# Patient Record
Sex: FEMALE | Race: WHITE | NOT HISPANIC OR LATINO | Employment: OTHER | URBAN - METROPOLITAN AREA
[De-identification: names, ages, dates, MRNs, and addresses within clinical notes are randomized per-mention and may not be internally consistent; named-entity substitution may affect disease eponyms.]

---

## 2022-05-17 ENCOUNTER — TRANSFERRED RECORDS (OUTPATIENT)
Dept: INTERNAL MEDICINE | Facility: CLINIC | Age: 72
End: 2022-05-17

## 2022-07-27 ENCOUNTER — OFFICE VISIT (OUTPATIENT)
Dept: INTERNAL MEDICINE | Facility: CLINIC | Age: 72
End: 2022-07-27
Payer: COMMERCIAL

## 2022-07-27 VITALS
OXYGEN SATURATION: 96 % | DIASTOLIC BLOOD PRESSURE: 82 MMHG | BODY MASS INDEX: 29.02 KG/M2 | SYSTOLIC BLOOD PRESSURE: 124 MMHG | HEART RATE: 82 BPM | HEIGHT: 62 IN | WEIGHT: 157.7 LBS | TEMPERATURE: 98.2 F | RESPIRATION RATE: 16 BRPM

## 2022-07-27 DIAGNOSIS — F43.10 PTSD (POST-TRAUMATIC STRESS DISORDER): ICD-10-CM

## 2022-07-27 DIAGNOSIS — K21.9 GASTROESOPHAGEAL REFLUX DISEASE WITHOUT ESOPHAGITIS: ICD-10-CM

## 2022-07-27 DIAGNOSIS — Z87.898 HISTORY OF MOTION SICKNESS: ICD-10-CM

## 2022-07-27 DIAGNOSIS — M79.604 PAIN IN BOTH LOWER EXTREMITIES: ICD-10-CM

## 2022-07-27 DIAGNOSIS — E11.9 TYPE 2 DIABETES MELLITUS WITHOUT COMPLICATION, WITHOUT LONG-TERM CURRENT USE OF INSULIN (H): Primary | ICD-10-CM

## 2022-07-27 DIAGNOSIS — R20.2 PARESTHESIA: ICD-10-CM

## 2022-07-27 DIAGNOSIS — L43.9 LICHEN PLANUS: ICD-10-CM

## 2022-07-27 DIAGNOSIS — A60.04 HERPES SIMPLEX VULVOVAGINITIS: ICD-10-CM

## 2022-07-27 DIAGNOSIS — M79.605 PAIN IN BOTH LOWER EXTREMITIES: ICD-10-CM

## 2022-07-27 DIAGNOSIS — Z78.0 POST-MENOPAUSAL: ICD-10-CM

## 2022-07-27 DIAGNOSIS — Z12.11 SPECIAL SCREENING FOR MALIGNANT NEOPLASMS, COLON: ICD-10-CM

## 2022-07-27 PROCEDURE — 99204 OFFICE O/P NEW MOD 45 MIN: CPT | Performed by: INTERNAL MEDICINE

## 2022-07-27 RX ORDER — BLOOD SUGAR DIAGNOSTIC
STRIP MISCELLANEOUS
COMMUNITY

## 2022-07-27 RX ORDER — FLUOXETINE 10 MG/1
10 CAPSULE ORAL DAILY
COMMUNITY
Start: 2020-10-12

## 2022-07-27 RX ORDER — ACETAMINOPHEN 325 MG/1
325 TABLET ORAL EVERY 6 HOURS PRN
COMMUNITY
Start: 2020-03-27

## 2022-07-27 RX ORDER — SCOLOPAMINE TRANSDERMAL SYSTEM 1 MG/1
1 PATCH, EXTENDED RELEASE TRANSDERMAL
Qty: 4 PATCH | Refills: 0 | Status: SHIPPED | OUTPATIENT
Start: 2022-07-27

## 2022-07-27 RX ORDER — HYDROCORTISONE 2.5 %
CREAM (GRAM) TOPICAL 2 TIMES DAILY
COMMUNITY

## 2022-07-27 RX ORDER — ATORVASTATIN CALCIUM 10 MG/1
10 TABLET, FILM COATED ORAL DAILY
COMMUNITY
End: 2022-09-26

## 2022-07-27 NOTE — PATIENT INSTRUCTIONS
I'd recommend checking with your insurance to see if they cover the new shingles vaccine, Shingrix.  This vaccine is much more effective than the older shingles vaccine.  Some insurances only cover this if you get it at the pharmacy rather than the clinic, so either check on this or just plan to get the vaccine at a pharmacy.       *    Thank you for visiting the Primary Care Center today at the [facility]! The following is some information about our clinic:     Primary Care Center Frequently-Asked Questions    (1) How do I schedule appointments at the [facility]?     Primary Care--to schedule or make changes to an existing appointment, please call our primary care line at [phone].    Labs--to schedule a lab appointment at the [facility] you can use [employer] or call [phone]. If you have a [facility] location that is closer to home, you can reach out to that location for scheduling options.     Imaging--if you need to schedule a CT, X-ray, MRI, ultrasound, or other imaging study you can call [phone] to schedule at the [facility] or any other [facility] imaging location.     Referrals--if a referral to another specialty was ordered you can expect a phone call from their scheduling team. If you have not heard from them in a week, please call us or send us a [employer] message to check the status or get a scheduling number. Please note that this only applies to internal [facility] referrals. If the referral is external you would need to contact their office for scheduling.     (2) I have a question about my visit, who do I contact?     You can call us at the primary care line at [phone] to ask questions about your visit. You can also send a secure message through [employer], which is reviewed by clinic staff. Please note that [employer] messages have a twenty-four to forty-eight business hour turnaround time and should not be used for  urgent concerns.    (3) How will I get the results of my tests?    If you are signed up for [employer] all tests will be released to you within twenty-four hours of resulting. Please allow three to five days for your doctor to review your results and place a note interpreting the results. If you do not have MyChart you will receive your results through mail seven to ten business days following the return of the tests. Please note that if there should be any urgent or concerning results that your doctor or their registered nurse will reach out to you the same day as the tests come back. If you have follow up questions about your results or would like to discuss the results in detail please schedule a follow up with your provider either in person or virtually.     (4) How do I get refills of my prescriptions?     You should always first contact your pharmacy for refills of your medications. If submitting a refill request on [employer], please be sure to submit the request only once--repeat requests can cause delays in refill. If you are requesting a NEW medication or a medication related to new symptoms you will need to schedule an appointment with a provider prior to approval. Please note: Routine medication refills have up to one to three business day turnaround whereas controlled substances refills have up to five to seven business day turnaround.    (5) I have new symptoms, what do I do?     If you are having an immediate medical emergency, you should dial 911 for assistance.   For anything urgent that needs to be seen within a few hours to one day you should visit a local urgent care for assistance.  For non-urgent symptoms that need to be seen within a few days to a week you can schedule with an available provider in primary care by going to [employer] or calling [phone].   If you are not sure how serious your symptoms are or you would like to receive medical advice you can always call [phone] to speak with a  triage nurse.

## 2022-07-27 NOTE — NURSING NOTE
Chief Complaint   Patient presents with     Establish Care     Musculoskeletal Problem       Ramon Douglas, EMT at 12:23 PM on 7/27/2022.

## 2022-07-27 NOTE — PROGRESS NOTES
Assessment & Plan     Type 2 diabetes mellitus without complication, without long-term current use of insulin (H)    Adequately controlled with A1C of 7.8 in March.  Continue metformin and atorvastatin.  Check labs in September.  Normal foot exam today.  She reports eye exam is UTD; will request records.     - Hemoglobin A1c; Future  - Lipid panel reflex to direct LDL Fasting; Future    History of motion sickness    Going whale watching and she requests refill of scopolamine patch which she has used in the past.     - scopolamine (TRANSDERM) 1 MG/3DAYS 72 hr patch; Place 1 patch onto the skin every 72 hours    Special screening for malignant neoplasms, colon    - Fecal cancer screen FIT; Future    Post-menopausal    - Dexa hip/pelvis/spine*; Future    Pain in both lower extremities  And  Paresthesia     Appears to have some bony deformity in the R lateral foot and ankle, which may be arthritis related and possible contributory to pain.  Distribution of symptoms could be suggestive of neuropathy, though her description of symptoms is not classic and monofilament exam is normal.  Will check labs as below at next lab draw.  Discussed x-ray of R foot and ankle, but she deferred further work-up for now as symptoms are manageable.  Could also consider EMG for further eval.     - Hemoglobin A1c; Future  - Anti Nuclear Francoise IgG by IFA with Reflex; Future  - Erythrocyte sedimentation rate; Future  - Protein electrophoresis; Future  - TSH with free T4 reflex; Future    PTSD (post-traumatic stress disorder)    Well controlled on fluoxetine    Herpes simplex vulvovaginitis    Takes acyclovir prn    Gastroesophageal reflux disease without esophagitis    Well controlled with dietary management    Lichen planus    Is monitoring this through dentist        Return in about 2 months (around 9/27/2022) for Follow up with labs prior.    Kofi Hand MD  [facility] INTERNAL MEDICINE HOLGER Meneses is  "a 71 year old, presenting for the following health issues:  Establish Care and Musculoskeletal Problem      HPI     Hayden moved to the area recently from [place].  She has a history of left knee replacement and R knee athroscopy and has been having bilateral lower leg pain for a couple of years, starting first in a toe on the R foot then spreading through the feet and spreading up to the knees.  Feels like a deeper MSK sensation, like something is changing.  No shooting pain.  \"Wringing like sensation\", not numb or burning.  Has rare episodes when the R foot freeze up and is painful.  Lots of L knee sensitivity.  Rarely gets leg/knee weakness with mis-stepping.  She isn't sure if this is diabetic neuropathy related or something else.  Has done PT in the past and they feel she is having some L hip problems as well.  Couple of years ago she noticed increased protrusion of the R lateral malleolus- hit is on a table.  Has noticed some protrusion in the R lateral foot.  Has not had this leg issues evaluated in the past.      She has a history of PTSD and reports doing well on Prozac.    Has a left hand contracture that has been stable.    History of genital herpes with prn acyclovir.    History of DM2.  Last A1C on 3/17 was 7.8.      History of reflux improved on reducing coffee.    Has had lichen planus for a few years being monitored by dentistry- no biopsy to date    She is working on weight loss.  Swims and walks frequently.      She is going whale watching in a few weeks and would like a refill on scopolamine patches.        Review of Systems   Constitutional, MSK, neuro, endo systems are negative, except as otherwise noted.      Objective    /82 (BP Location: Left arm, Patient Position: Sitting, Cuff Size: Adult Regular)   Pulse 82   Temp 98.2  F (36.8  C) (Oral)   Resp 16   Ht 1.575 m (5' 2\")   Wt 71.5 kg (157 lb 11.2 oz)   SpO2 96%   BMI 28.84 kg/m    Body mass index is 28.84 kg/m .  Physical Exam "   GENERAL: healthy, alert and no distress  MS: likely bony deformity of R lateral malleolus of ankle and R lateral foot  Diabetic foot exam: normal DP and PT pulses, no trophic changes or ulcerative lesions and normal monofilament exam              .  ..

## 2022-08-02 ENCOUNTER — TELEPHONE (OUTPATIENT)
Dept: INTERNAL MEDICINE | Facility: CLINIC | Age: 72
End: 2022-08-02

## 2022-08-02 NOTE — TELEPHONE ENCOUNTER
[facility] Call Center    Phone Message    May a detailed message be left on voicemail: yes     Reason for Call: Other: Per call from patient, please extend lab orders to 9/26 scheduled appt. Please reach out to patient for updates.      Action Taken: Message routed to:  [facility]    Travel Screening: Not Applicable

## 2022-08-26 ENCOUNTER — TELEPHONE (OUTPATIENT)
Dept: INTERNAL MEDICINE | Facility: CLINIC | Age: 72
End: 2022-08-26

## 2022-08-26 DIAGNOSIS — U07.1 INFECTION DUE TO 2019 NOVEL CORONAVIRUS: Primary | ICD-10-CM

## 2022-08-26 NOTE — TELEPHONE ENCOUNTER
RN called patient and let her know Rx was sent to pharmacy in [place], [place], and also relayed Dr. Hand's message about not taking this with Lipitor.    Marisela Rodríguez RN on 8/26/2022 at 2:22 PM

## 2022-08-26 NOTE — TELEPHONE ENCOUNTER
[facility] Call Center    Phone Message    May a detailed message be left on voicemail: yes     Reason for Call: Medication Question or concern regarding medication   Prescription Clarification  Name of Medication: paxlovid  Prescribing Provider: anyone   Pharmacy: [facility], [address] [place] [id number], phone is [phone]   What on the order needs clarification? Pt called and stated she tested positive for covid today, requesting above script be sent to the above pharmacy today. Pt stated she is currently in [place], they are 4 hours behind and the pharmacy does not open until 1pm our time

## 2022-08-26 NOTE — TELEPHONE ENCOUNTER
Patient called, following up on status of script. Per patient, it's urgent she gets script today as today is the last day window for treatment and if another provider can sign rx if Dr Hand is not available. Please review and reach out to patient.

## 2022-08-26 NOTE — TELEPHONE ENCOUNTER
Prescription for Paxlovid sent.  Please advise her to not take the atorvastatin while on Paxlovid since these interact.    Thanks,  Kofi Hand MD

## 2022-09-26 ENCOUNTER — LAB (OUTPATIENT)
Dept: LAB | Facility: CLINIC | Age: 72
End: 2022-09-26
Payer: COMMERCIAL

## 2022-09-26 DIAGNOSIS — E11.9 TYPE 2 DIABETES MELLITUS WITHOUT COMPLICATION, WITHOUT LONG-TERM CURRENT USE OF INSULIN (H): ICD-10-CM

## 2022-09-26 DIAGNOSIS — M79.604 PAIN IN BOTH LOWER EXTREMITIES: ICD-10-CM

## 2022-09-26 DIAGNOSIS — Z12.11 SPECIAL SCREENING FOR MALIGNANT NEOPLASMS, COLON: ICD-10-CM

## 2022-09-26 DIAGNOSIS — R20.2 PARESTHESIA: ICD-10-CM

## 2022-09-26 DIAGNOSIS — E11.9 TYPE 2 DIABETES MELLITUS WITHOUT COMPLICATION, WITHOUT LONG-TERM CURRENT USE OF INSULIN (H): Primary | ICD-10-CM

## 2022-09-26 DIAGNOSIS — M79.605 PAIN IN BOTH LOWER EXTREMITIES: ICD-10-CM

## 2022-09-26 LAB
CHOLEST SERPL-MCNC: 224 MG/DL
ERYTHROCYTE [SEDIMENTATION RATE] IN BLOOD BY WESTERGREN METHOD: 17 MM/HR (ref 0–30)
HBA1C MFR BLD: 8 %
HDLC SERPL-MCNC: 59 MG/DL
LDLC SERPL CALC-MCNC: 140 MG/DL
NONHDLC SERPL-MCNC: 165 MG/DL
TOTAL PROTEIN SERUM FOR ELP: 6.7 G/DL (ref 6.4–8.3)
TRIGL SERPL-MCNC: 123 MG/DL
TSH SERPL DL<=0.005 MIU/L-ACNC: 4.07 UIU/ML (ref 0.3–4.2)

## 2022-09-26 PROCEDURE — 80061 LIPID PANEL: CPT | Performed by: INTERNAL MEDICINE

## 2022-09-26 PROCEDURE — 83036 HEMOGLOBIN GLYCOSYLATED A1C: CPT | Performed by: INTERNAL MEDICINE

## 2022-09-26 PROCEDURE — 84165 PROTEIN E-PHORESIS SERUM: CPT | Mod: 26 | Performed by: PATHOLOGY

## 2022-09-26 PROCEDURE — 84155 ASSAY OF PROTEIN SERUM: CPT | Performed by: INTERNAL MEDICINE

## 2022-09-26 PROCEDURE — 86038 ANTINUCLEAR ANTIBODIES: CPT | Performed by: INTERNAL MEDICINE

## 2022-09-26 PROCEDURE — 85652 RBC SED RATE AUTOMATED: CPT | Performed by: PATHOLOGY

## 2022-09-26 PROCEDURE — 84165 PROTEIN E-PHORESIS SERUM: CPT | Mod: TC | Performed by: PATHOLOGY

## 2022-09-26 PROCEDURE — 36415 COLL VENOUS BLD VENIPUNCTURE: CPT | Performed by: PATHOLOGY

## 2022-09-26 PROCEDURE — 84443 ASSAY THYROID STIM HORMONE: CPT | Performed by: INTERNAL MEDICINE

## 2022-09-26 RX ORDER — ATORVASTATIN CALCIUM 20 MG/1
20 TABLET, FILM COATED ORAL DAILY
Qty: 90 TABLET | Refills: 3 | Status: SHIPPED | OUTPATIENT
Start: 2022-09-26 | End: 2023-08-01

## 2022-09-27 LAB
ALBUMIN SERPL ELPH-MCNC: 4.2 G/DL (ref 3.7–5.1)
ALPHA1 GLOB SERPL ELPH-MCNC: 0.3 G/DL (ref 0.2–0.4)
ALPHA2 GLOB SERPL ELPH-MCNC: 0.8 G/DL (ref 0.5–0.9)
ANA SER QL IF: NEGATIVE
B-GLOBULIN SERPL ELPH-MCNC: 0.8 G/DL (ref 0.6–1)
GAMMA GLOB SERPL ELPH-MCNC: 0.8 G/DL (ref 0.7–1.6)
M PROTEIN SERPL ELPH-MCNC: 0 G/DL
PROT PATTERN SERPL ELPH-IMP: NORMAL

## 2022-09-28 ENCOUNTER — OFFICE VISIT (OUTPATIENT)
Dept: INTERNAL MEDICINE | Facility: CLINIC | Age: 72
End: 2022-09-28
Payer: COMMERCIAL

## 2022-09-28 VITALS
SYSTOLIC BLOOD PRESSURE: 136 MMHG | HEIGHT: 62 IN | OXYGEN SATURATION: 94 % | BODY MASS INDEX: 29.35 KG/M2 | DIASTOLIC BLOOD PRESSURE: 83 MMHG | WEIGHT: 159.5 LBS | HEART RATE: 76 BPM

## 2022-09-28 DIAGNOSIS — G89.29 CHRONIC PAIN OF BOTH KNEES: Primary | ICD-10-CM

## 2022-09-28 DIAGNOSIS — E78.5 DYSLIPIDEMIA: ICD-10-CM

## 2022-09-28 DIAGNOSIS — F41.8 SITUATIONAL ANXIETY: ICD-10-CM

## 2022-09-28 DIAGNOSIS — Z23 NEED FOR VACCINATION: ICD-10-CM

## 2022-09-28 DIAGNOSIS — A60.00 GENITAL HERPES SIMPLEX, UNSPECIFIED SITE: ICD-10-CM

## 2022-09-28 DIAGNOSIS — Z11.59 NEED FOR HEPATITIS C SCREENING TEST: ICD-10-CM

## 2022-09-28 DIAGNOSIS — M25.561 CHRONIC PAIN OF BOTH KNEES: Primary | ICD-10-CM

## 2022-09-28 DIAGNOSIS — M25.562 CHRONIC PAIN OF BOTH KNEES: Primary | ICD-10-CM

## 2022-09-28 DIAGNOSIS — E11.9 TYPE 2 DIABETES MELLITUS WITHOUT COMPLICATION, WITHOUT LONG-TERM CURRENT USE OF INSULIN (H): ICD-10-CM

## 2022-09-28 PROCEDURE — G0009 ADMIN PNEUMOCOCCAL VACCINE: HCPCS | Performed by: INTERNAL MEDICINE

## 2022-09-28 PROCEDURE — 90677 PCV20 VACCINE IM: CPT | Performed by: INTERNAL MEDICINE

## 2022-09-28 PROCEDURE — G0008 ADMIN INFLUENZA VIRUS VAC: HCPCS | Performed by: INTERNAL MEDICINE

## 2022-09-28 PROCEDURE — 99213 OFFICE O/P EST LOW 20 MIN: CPT | Mod: 25 | Performed by: INTERNAL MEDICINE

## 2022-09-28 PROCEDURE — 90662 IIV NO PRSV INCREASED AG IM: CPT | Performed by: INTERNAL MEDICINE

## 2022-09-28 RX ORDER — ACYCLOVIR 400 MG/1
400 TABLET ORAL EVERY 8 HOURS
Qty: 15 TABLET | Refills: 3 | Status: SHIPPED | OUTPATIENT
Start: 2022-09-28 | End: 2022-10-17

## 2022-09-28 RX ORDER — LORAZEPAM 0.5 MG/1
0.5 TABLET ORAL DAILY PRN
Qty: 3 TABLET | Refills: 1 | Status: SHIPPED | OUTPATIENT
Start: 2022-09-28

## 2022-09-28 NOTE — PROGRESS NOTES
"  Assessment & Plan     Chronic pain of both knees  - Pt will follow up if she wants PT referral placed, currently managing well with PT home exercises.     Type 2 diabetes mellitus without complication, without long-term current use of insulin (H)  A1C borderline at 8.0. Pt previously tried increasing metformin dose to 500mg QID, but did not tolerate d/t nausea. Plan to stay at current dose of 500mg TID. Recheck A1C in 3 months.   - Basic metabolic panel; Future  - Albumin Random Urine Quantitative with Creat Ratio; Future  - metFORMIN (GLUCOPHAGE) 500 MG tablet; Take 1 tablet (500 mg) by mouth 3 times daily (with meals)    Genital herpes simplex, unspecified site  Pt reports genital herpes flares 2-3x/year. Takes acyclovir as needed during these outbreaks.   - acyclovir (ZOVIRAX) 400 MG tablet; Take 1 tablet (400 mg) by mouth every 8 hours for 5 days    Need for hepatitis C screening test  - Hepatitis C Screen Reflex to HCV RNA Quant and Genotype; Future    Situational anxiety  Pt reports taking 0.5mg lorazepam prior to dental appointments approximately 3-4x/year.   - LORazepam (ATIVAN) 0.5 MG tablet; Take 1 tablet (0.5 mg) by mouth daily as needed for anxiety (prior to dental appointments)    Dyslipidemia  LDL elevated at 140, atorvastatin dose increased from 10mg to 20mg, plan to recheck labs/follow up in 3 months.   - Atorvastatin (lipitor) 20 MG tablet; Take 1 tablet (20 mg) by mouth daily             BMI:   Estimated body mass index is 29.17 kg/m  as calculated from the following:    Height as of this encounter: 1.575 m (5' 2.01\").    Weight as of this encounter: 72.3 kg (159 lb 8 oz).       Kofi Hand MD  [facility] INTERNAL MEDICINE [place]    Nestor Blake is a 72 year old, presenting for the following health issues: diabetes management & leg pain follow up and prescription refills. A1c is borderline at 8.0, pt is continues to take metformin 500mg TID. Pt reports efforts to " "stay physically fit including: swimming 3-4miles/wk & walking 0.5miles per day. Pt's cholesterol is elevated at 224, she reports compliance with increased atorvastatin dosage from 10mg to 20mg/day. Now that she is back from vacation in [place], she reports focusing more on a healthy diet.     Pt saw PT in [place] for her knees, which has helped her increase ROM and walking upstairs. Pt may want a future PT referral in [place]. Pt still reports leg tingling/weakness that is worse at night. Tingling/weakness starts in the soles of her feet radiating up through thighs. Pt feels like these sensations are progressing but does not wish to pursue further diagnostic testing as it is manageable.      Pt Had Covid19 on 8/27/22 & took paxlovid, plan to wait a couple months for the booster. Pt wants both influenza and PCV vaccines today. Pt will check with insurance and plan to get zoster vaccine once approved.    Pt reports taking lorazepam 0.5mg 3x/year prior to dental appointments. Needs refill on file. Pt also takes acyclovir during genital herpes flare ups 2-3x/year and also needs refill on file.     Review of Systems   Constitutional, HEENT, cardiovascular, pulmonary, gi and gu systems are negative, except as otherwise noted.      Objective    /83 (BP Location: Right arm, Patient Position: Sitting, Cuff Size: Adult Regular)   Pulse 76   Ht 1.575 m (5' 2.01\")   Wt 72.3 kg (159 lb 8 oz)   SpO2 94%   BMI 29.17 kg/m    Body mass index is 29.17 kg/m .  Physical Exam   GENERAL: healthy, alert and no distress  MS: no gross musculoskeletal defects noted, no edema    Cesia Roe, MSN, RN  DNP Student     "

## 2022-09-28 NOTE — NURSING NOTE
Zaira Hartmann is a 72 year old female patient that presents today in clinic for the following:    Chief Complaint   Patient presents with     RECHECK     Lab result review, hep c tests, flu shot, covid shot, shingrix, eye exam, PT for knee and legs, medication review     The patient's allergies and medications were reviewed as noted. A set of vitals were recorded as noted without incident. The patient does not have any other questions for the provider.    Srikanth Martínez, EMT at 10:42 AM on 9/28/2022

## 2022-10-03 ENCOUNTER — ANCILLARY PROCEDURE (OUTPATIENT)
Dept: BONE DENSITY | Facility: CLINIC | Age: 72
End: 2022-10-03
Attending: INTERNAL MEDICINE
Payer: COMMERCIAL

## 2022-10-03 ENCOUNTER — HEALTH MAINTENANCE LETTER (OUTPATIENT)
Age: 72
End: 2022-10-03

## 2022-10-03 DIAGNOSIS — Z78.0 POST-MENOPAUSAL: ICD-10-CM

## 2022-10-03 PROCEDURE — 77080 DXA BONE DENSITY AXIAL: CPT | Performed by: INTERNAL MEDICINE

## 2022-10-15 ENCOUNTER — MYC MEDICAL ADVICE (OUTPATIENT)
Dept: INTERNAL MEDICINE | Facility: CLINIC | Age: 72
End: 2022-10-15

## 2022-10-15 DIAGNOSIS — A60.00 GENITAL HERPES SIMPLEX, UNSPECIFIED SITE: ICD-10-CM

## 2022-10-17 RX ORDER — ACYCLOVIR 400 MG/1
400 TABLET ORAL EVERY 8 HOURS
Qty: 15 TABLET | Refills: 3 | Status: SHIPPED | OUTPATIENT
Start: 2022-10-17 | End: 2023-07-28

## 2022-12-05 PROCEDURE — 82274 ASSAY TEST FOR BLOOD FECAL: CPT | Performed by: PATHOLOGY

## 2022-12-06 LAB — HEMOCCULT STL QL IA: NEGATIVE

## 2022-12-16 ENCOUNTER — MYC MEDICAL ADVICE (OUTPATIENT)
Dept: INTERNAL MEDICINE | Facility: CLINIC | Age: 72
End: 2022-12-16

## 2023-01-31 ENCOUNTER — MYC MEDICAL ADVICE (OUTPATIENT)
Dept: INTERNAL MEDICINE | Facility: CLINIC | Age: 73
End: 2023-01-31
Payer: COMMERCIAL

## 2023-01-31 DIAGNOSIS — E11.9 TYPE 2 DIABETES MELLITUS WITHOUT COMPLICATION, WITHOUT LONG-TERM CURRENT USE OF INSULIN (H): ICD-10-CM

## 2023-01-31 NOTE — TELEPHONE ENCOUNTER
metFORMIN (GLUCOPHAGE) 500 MG tablet  Last Written Prescription Date:  9/28/22  Last Fill Quantity: 270,   # refills: 3  [place], [place] - [address] [place] [address] [phone]  Remaining refills sent to requested pharmacy.   #[id number]  [facility]

## 2023-02-12 ENCOUNTER — HEALTH MAINTENANCE LETTER (OUTPATIENT)
Age: 73
End: 2023-02-12

## 2023-04-09 ENCOUNTER — E-VISIT (OUTPATIENT)
Dept: INTERNAL MEDICINE | Facility: CLINIC | Age: 73
End: 2023-04-09
Payer: COMMERCIAL

## 2023-04-09 DIAGNOSIS — K14.8 TONGUE LESION: Primary | ICD-10-CM

## 2023-04-09 PROCEDURE — 99421 OL DIG E/M SVC 5-10 MIN: CPT | Performed by: INTERNAL MEDICINE

## 2023-04-27 ENCOUNTER — MYC MEDICAL ADVICE (OUTPATIENT)
Dept: INTERNAL MEDICINE | Facility: CLINIC | Age: 73
End: 2023-04-27
Payer: MEDICARE

## 2023-05-21 ENCOUNTER — HEALTH MAINTENANCE LETTER (OUTPATIENT)
Age: 73
End: 2023-05-21

## 2023-06-25 ENCOUNTER — MYC MEDICAL ADVICE (OUTPATIENT)
Dept: INTERNAL MEDICINE | Facility: CLINIC | Age: 73
End: 2023-06-25
Payer: MEDICARE

## 2023-06-25 DIAGNOSIS — E11.9 TYPE 2 DIABETES MELLITUS WITHOUT COMPLICATION, WITHOUT LONG-TERM CURRENT USE OF INSULIN (H): Primary | ICD-10-CM

## 2023-07-10 ENCOUNTER — LAB (OUTPATIENT)
Dept: LAB | Facility: CLINIC | Age: 73
End: 2023-07-10
Payer: COMMERCIAL

## 2023-07-10 DIAGNOSIS — E11.9 TYPE 2 DIABETES MELLITUS WITHOUT COMPLICATION, WITHOUT LONG-TERM CURRENT USE OF INSULIN (H): ICD-10-CM

## 2023-07-10 DIAGNOSIS — Z11.59 NEED FOR HEPATITIS C SCREENING TEST: ICD-10-CM

## 2023-07-10 LAB
ANION GAP SERPL CALCULATED.3IONS-SCNC: 11 MMOL/L (ref 7–15)
BUN SERPL-MCNC: 11.1 MG/DL (ref 8–23)
CALCIUM SERPL-MCNC: 10 MG/DL (ref 8.8–10.2)
CHLORIDE SERPL-SCNC: 105 MMOL/L (ref 98–107)
CHOLEST SERPL-MCNC: 184 MG/DL
CREAT SERPL-MCNC: 0.7 MG/DL (ref 0.51–0.95)
CREAT UR-MCNC: 79.9 MG/DL
DEPRECATED HCO3 PLAS-SCNC: 23 MMOL/L (ref 22–29)
GFR SERPL CREATININE-BSD FRML MDRD: >90 ML/MIN/1.73M2
GLUCOSE SERPL-MCNC: 173 MG/DL (ref 70–99)
HBA1C MFR BLD: 6.8 %
HCV AB SERPL QL IA: NONREACTIVE
HDLC SERPL-MCNC: 58 MG/DL
LDLC SERPL CALC-MCNC: 93 MG/DL
MICROALBUMIN UR-MCNC: <12 MG/L
MICROALBUMIN/CREAT UR: NORMAL MG/G{CREAT}
NONHDLC SERPL-MCNC: 126 MG/DL
POTASSIUM SERPL-SCNC: 4.5 MMOL/L (ref 3.4–5.3)
SODIUM SERPL-SCNC: 139 MMOL/L (ref 136–145)
TRIGL SERPL-MCNC: 167 MG/DL

## 2023-07-10 PROCEDURE — 80061 LIPID PANEL: CPT | Performed by: PATHOLOGY

## 2023-07-10 PROCEDURE — 82570 ASSAY OF URINE CREATININE: CPT | Performed by: INTERNAL MEDICINE

## 2023-07-10 PROCEDURE — 99000 SPECIMEN HANDLING OFFICE-LAB: CPT | Performed by: PATHOLOGY

## 2023-07-10 PROCEDURE — 80048 BASIC METABOLIC PNL TOTAL CA: CPT | Performed by: PATHOLOGY

## 2023-07-10 PROCEDURE — 83036 HEMOGLOBIN GLYCOSYLATED A1C: CPT | Performed by: INTERNAL MEDICINE

## 2023-07-10 PROCEDURE — 36415 COLL VENOUS BLD VENIPUNCTURE: CPT | Performed by: PATHOLOGY

## 2023-07-10 PROCEDURE — 86803 HEPATITIS C AB TEST: CPT | Performed by: INTERNAL MEDICINE

## 2023-07-27 DIAGNOSIS — A60.00 GENITAL HERPES SIMPLEX, UNSPECIFIED SITE: ICD-10-CM

## 2023-07-28 ENCOUNTER — MYC MEDICAL ADVICE (OUTPATIENT)
Dept: INTERNAL MEDICINE | Facility: CLINIC | Age: 73
End: 2023-07-28
Payer: MEDICARE

## 2023-07-28 RX ORDER — ACYCLOVIR 400 MG/1
400 TABLET ORAL EVERY 8 HOURS
Qty: 15 TABLET | Refills: 0 | Status: SHIPPED | OUTPATIENT
Start: 2023-07-28 | End: 2023-09-21

## 2023-07-30 ENCOUNTER — MYC MEDICAL ADVICE (OUTPATIENT)
Dept: INTERNAL MEDICINE | Facility: CLINIC | Age: 73
End: 2023-07-30
Payer: MEDICARE

## 2023-08-01 NOTE — PATIENT INSTRUCTIONS
Send us a copy of your advanced health care directive.     Mammogram was ordered.  Please call [phone] to schedule.       Patient Education   Personalized Prevention Plan  You are due for the preventive services outlined below.  Your care team is available to assist you in scheduling these services.  If you have already completed any of these items, please share that information with your care team to update in your medical record.  Health Maintenance Due   Topic Date Due    Discuss Advance Care Planning  Never done    Eye Exam  Never done    Zoster (Shingles) Vaccine (1 of 2) Never done    Annual Wellness Visit  09/13/2016    FALL RISK ASSESSMENT  Never done    Mammogram  09/06/2018    PHQ-2 (once per calendar year)  Never done    Diptheria Tetanus Pertussis (DTAP/TDAP/TD) Vaccine (2 - Td or Tdap) 06/27/2023    Diabetic Foot Exam  07/27/2023         Tele call to patient to discuss below message. \"I had a procedure with Dr Christian (L5, S1 ZACKARY on 1/27/22 with Dr Christian) and the pain going down my legs is so bad!  It was a 6 before the procedure, after the procedure there was no pain because I was numb from the waist down. After a few hours the pain started coming back, and Right now I have pain numbness and tingling going down my legs, the left >right. It is an 8!  I have used ice to my back, and tried tylenol.  When the numbness wore off, I noticed I had peed my pants, but I have no leaking of pee or poop since then. I have been icing it, I have taken tylenol, I cant take motrin.\"    Patient is informed that message will be discussed with MD and we will contact her when response received.  Text of above to Dr Christian.

## 2023-08-01 NOTE — PROGRESS NOTES
SUBJECTIVE:   Hayden is a 72 year old who presents for Preventive Visit.    Are you in the first 12 months of your Medicare coverage?  No    HPI      Have you ever done Advance Care Planning? (For example, a Health Directive, POLST, or a discussion with a medical provider or your loved ones about your wishes): Yes, patient states has an Advance Care Planning document and will bring a copy to the clinic.      Hayden would like a skin check. She's had some slow growing spots on the right ear, back, and both legs and was seeing dermatology in the past without concerns, but it's been 2-3 years.       Eye exam scheduled 8/24.      PHQ-2 Score:         8/2/2023    12:00 PM   PHQ-2 ( 1999 Pfizer)   Q1: Little interest or pleasure in doing things 1   Q2: Feeling down, depressed or hopeless 1   PHQ-2 Score 2     She feels she is managing mood fine.  Takes fluoxetine prn for about a week per year.     Fall risk    Fallen 2 or more times in the past year?: No  Any fall with injury in the past year?: No    Cognitive Screening   1) Repeat 3 items (Leader, Season, Table)    2) Clock draw:   NORMAL  3) 3 item recall: Recalls 3 objects  Results: NORMAL clock, 3 items recalled: COGNITIVE IMPAIRMENT LESS LIKELY     Mini-CogTM Copyright S Sonal. Licensed by the author for use in [facility]; reprinted with permission (ann marie@.[facility]). All rights reserved.        Reviewed and updated as needed this visit by clinical staff   Tobacco  Allergies  Meds  Problems             Reviewed and updated as needed this visit by Provider     Meds  Problems            Social History     Tobacco Use    Smoking status: Never    Smokeless tobacco: Never   Substance Use Topics    Alcohol use: Yes     Alcohol/week: 1.0 standard drink of alcohol     Types: 1 Standard drinks or equivalent per week              No data to display              Do you have a current opioid prescription? No  Do you use any other controlled substances or medications  "that are not prescribed by a provider? None    Current providers sharing in care for this patient include:   Patient Care Team:  Kofi Hand MD as PCP - General (Internal Medicine)  Kofi Hand MD as Assigned PCP    The following health maintenance items are reviewed in Epic and correct as of today:  Health Maintenance   Topic Date Due    EYE EXAM  Never done    ZOSTER IMMUNIZATION (1 of 2) Never done    MEDICARE ANNUAL WELLNESS VISIT  09/13/2016    MAMMO SCREENING  09/06/2018    DTAP/TDAP/TD IMMUNIZATION (2 - Td or Tdap) 06/27/2023    INFLUENZA VACCINE (1) 09/01/2023    A1C  10/10/2023    COLORECTAL CANCER SCREENING  12/05/2023    BMP  07/10/2024    LIPID  07/10/2024    MICROALBUMIN  07/10/2024    DIABETIC FOOT EXAM  08/02/2024    FALL RISK ASSESSMENT  08/02/2024    ADVANCE CARE PLANNING  08/02/2028    DEXA  10/03/2032    HEPATITIS C SCREENING  Completed    PHQ-2 (once per calendar year)  Completed    Pneumococcal Vaccine: 65+ Years  Completed    COVID-19 Vaccine  Completed    IPV IMMUNIZATION  Aged Out    MENINGITIS IMMUNIZATION  Aged Out         Review of Systems  10 point ROS of systems including Constitutional, Eyes, Respiratory, Cardiovascular, Gastroenterology, Genitourinary, Integumentary, Muscularskeletal, Psychiatric were all negative except for pertinent positives noted in my HPI.     OBJECTIVE:   /81 (BP Location: Right arm, Patient Position: Sitting, Cuff Size: Adult Regular)   Pulse 78   Ht 1.594 m (5' 2.76\")   Wt 72.1 kg (159 lb)   SpO2 93%   BMI 28.39 kg/m   Estimated body mass index is 28.39 kg/m  as calculated from the following:    Height as of this encounter: 1.594 m (5' 2.76\").    Weight as of this encounter: 72.1 kg (159 lb).  Physical Exam  GENERAL: healthy, alert and no distress  EYES: Eyes grossly normal to inspection, PERRL and conjunctivae and sclerae normal  HENT: normal cephalic/atraumatic, ear canals and TM's normal, and lichen planus on left side of tongue  RESP: lungs " clear to auscultation - no rales, rhonchi or wheezes  CV: regular rate and rhythm, normal S1 S2, no S3 or S4, no murmur, click or rub, no peripheral edema and peripheral pulses strong  ABDOMEN: soft, nontender, no hepatosplenomegaly, no masses and bowel sounds normal  MS: no gross musculoskeletal defects noted, no edema  SKIN: Two pigmented lesions on left shin with area of rough skin  NEURO: Normal strength and tone, mentation intact and speech normal  PSYCH: mentation appears normal, affect normal/bright  Diabetic foot exam: normal DP and PT pulses, no trophic changes or ulcerative lesions, and Left toe reduced sensation to monofilament      ASSESSMENT / PLAN:   (Z00.00) Encounter for Medicare annual wellness exam  (primary encounter diagnosis)    Immunizations: advised tetanus and Shingirx at pharmacy  Lab Studies: UTD  Mammogram: ordered  Colonoscopy/FIT: UTD  Advanced care planning: has at home, will send copy     (E11.9) Type 2 diabetes mellitus without complication, without long-term current use of insulin (H)  Comment: Well controlled with A1C of 6.8.  Continue current medication.  Recommended 6 month recheck but she prefers to follow-up annual.  Eye exam scheduled.  Foot exam done today with slight numbness noted in left 1st toe, will monitor.   Plan: FOOT EXAM, atorvastatin (LIPITOR) 20 MG tablet,        metFORMIN (GLUCOPHAGE) 500 MG tablet,         **Hemoglobin A1c FUTURE 6mo            (H90.3) Bilateral sensorineural hearing loss  Comment:   Plan: Adult Audiology  Referral            (Z12.31) Visit for screening mammogram  Comment:   Plan: MA Screen Bilateral w/[place]            (E78.1) Hypertriglyceridemia  Comment: Hayden's trigs were slightly high on recent blood work and she was reading about potential causes including thyroid and liver dysfunction and wonders if these labs have been checked.  TSH was normal last year, no hepatic panel on file here.  We can check with her next lab draw-  "discussed that the triglycerides are only slightly elevated, so overall I am not too concerned.   Plan: Hepatic panel (Albumin, ALT, AST, Bili, Alk         Phos, TP), TSH with free T4 reflex            (L98.9) Skin lesion  Comment: She has some growing pigmented lesions on the left calf that are develops some rough spots, so I recommend seeing derm  Plan: Adult Dermatology Referral              COUNSELING:  Reviewed preventive health counseling, as reflected in patient instructions      BMI:   Estimated body mass index is 28.39 kg/m  as calculated from the following:    Height as of this encounter: 1.594 m (5' 2.76\").    Weight as of this encounter: 72.1 kg (159 lb).         She reports that she has never smoked. She has never used smokeless tobacco.      Appropriate preventive services were discussed with this patient, including applicable screening as appropriate for cardiovascular disease, diabetes, osteopenia/osteoporosis, and glaucoma.  As appropriate for age/gender, discussed screening for colorectal cancer, prostate cancer, breast cancer, and cervical cancer. Checklist reviewing preventive services available has been given to the patient.    Reviewed patients plan of care and provided an AVS. The Basic Care Plan (routine screening as documented in Health Maintenance) for Zaira meets the Care Plan requirement. This Care Plan has been established and reviewed with the Patient.          Kofi Hand MD  [facility] INTERNAL MEDICINE [place]    Identified Health Risks: none  "

## 2023-08-02 ENCOUNTER — OFFICE VISIT (OUTPATIENT)
Dept: INTERNAL MEDICINE | Facility: CLINIC | Age: 73
End: 2023-08-02
Payer: COMMERCIAL

## 2023-08-02 VITALS
SYSTOLIC BLOOD PRESSURE: 136 MMHG | HEART RATE: 78 BPM | OXYGEN SATURATION: 93 % | HEIGHT: 63 IN | BODY MASS INDEX: 28.17 KG/M2 | WEIGHT: 159 LBS | DIASTOLIC BLOOD PRESSURE: 81 MMHG

## 2023-08-02 DIAGNOSIS — Z12.31 VISIT FOR SCREENING MAMMOGRAM: ICD-10-CM

## 2023-08-02 DIAGNOSIS — L98.9 SKIN LESION: ICD-10-CM

## 2023-08-02 DIAGNOSIS — Z00.00 ENCOUNTER FOR MEDICARE ANNUAL WELLNESS EXAM: Primary | ICD-10-CM

## 2023-08-02 DIAGNOSIS — H90.3 BILATERAL SENSORINEURAL HEARING LOSS: ICD-10-CM

## 2023-08-02 DIAGNOSIS — E78.1 HYPERTRIGLYCERIDEMIA: ICD-10-CM

## 2023-08-02 DIAGNOSIS — E11.9 TYPE 2 DIABETES MELLITUS WITHOUT COMPLICATION, WITHOUT LONG-TERM CURRENT USE OF INSULIN (H): ICD-10-CM

## 2023-08-02 PROCEDURE — 99397 PER PM REEVAL EST PAT 65+ YR: CPT | Performed by: INTERNAL MEDICINE

## 2023-08-02 PROCEDURE — 99213 OFFICE O/P EST LOW 20 MIN: CPT | Mod: 25 | Performed by: INTERNAL MEDICINE

## 2023-08-02 RX ORDER — ATORVASTATIN CALCIUM 20 MG/1
20 TABLET, FILM COATED ORAL DAILY
Qty: 90 TABLET | Refills: 3 | Status: SHIPPED | OUTPATIENT
Start: 2023-08-02 | End: 2024-07-20

## 2023-08-02 NOTE — NURSING NOTE
Zaira Hartmann is a 72 year old female patient that presents today in clinic for the following:    Chief Complaint   Patient presents with    Physical     Skin check     The patient's allergies and medications were reviewed as noted. A set of vitals were recorded as noted without incident. The patient does not have any other questions for the provider.    Srikanth Martínez, EMT at 11:49 AM on 8/2/2023

## 2023-08-02 NOTE — PROGRESS NOTES
Medicare Annual Wellness Questionnaire:  This 72 year old year old female presents for a Medicare Wellness Exam.    Providers/clinics involved in care:  Patient Care Team         Relationship Specialty Notifications Start End    Koif Hand MD PCP - General Internal Medicine  4/28/23     Phone: [phone] Fax: [phone] [address] [facility] [address]    Kofi Hand MD Assigned PCP   7/6/22     Phone: [phone] Fax: [phone] [address] [facility] [address]            Fall Risk Assessment:    Have you fallen 2 or more times in the last year?  No    How many times were you injured due to a fall in the last year?  none    PHQ-2:    Over the last 2 weeks, how often have you been bothered by feeling down, depressed, or hopeless?     Not at all (0) Several Days (1) More than half the days (2) Nearly every day (3)    Over the last 2 weeks, how often have you had little interest or pleasure in doing things?     Not at all (0) Several Days (1) More than half the days (2) Nearly every day (3)    Social History:    What is your marital status?  Single    Who lives in your household?  Me and my sister    Does your home have loose rugs in the hallway:     Yes     Does your home have grab bars in the bathroom:    No    Does your home have handrails on the stairs?  Yes     Does your home have poorly lit areas?    No    Do you feel threatened or controlled by a partner, ex-partner or anyone in your life?   No    Has anyone hurt you physically, for example by pushing, hitting, slapping or kicking you   or forcing you to have sex?   No    Do you need help with the phone, transportation, shopping, preparing meals, housework, laundry, medications or managing money?   No    Sexual Health:    Are you sexually active?    No    If yes, with men, women, or both?   Men Women Both    If yes, how many partners?  N/a    If yes, are you using condoms?    No    Have you had any sexually  transmitted infections in the last year?   Herpies    Do you have any sexual concerns?    No    Women Only:    Women: What year did you stop having periods (approximate age)?  2000    Women: Any vaginal bleeding in the last year?    No    Women: Have you ever had an abnormal Pap smear?    Yes, 1975    General Health Assessment:    Have you noticed any hearing difficulties?   Yes     Do you wear hearing aids?   No    Have you seen a hearing professional such as an audiologist in the last 1 year?   No    Do you have vision difficulty?    Yes     Do you wear glasses or contacts?   Yes     Have you seen an eye doctor in the last 1 year?   Yes     How many servings of fruits and vegetables do you eat a day?  4 cups and 1 cup    How often do you exercise in a week?  3 times    How long and what kind of exercise do you do?  Swim 60 laps/ 35 min    Tobacco and Alcohol History:    Do you use tobacco/nicotine products?    No    If yes, please list the method of use and average weekly consumption?  N/a    Do you use any other drugs?   Yes          Do you drink alcohol?   Yes     If you drink alcohol, how many drinks per week?  1      Advance Directive:    Have you completed an Advance Directives document?  Yes     If yes, have you given a copy to the clinic?   No    Do you need information on Advance Directives?   No    Srikanth Martínez, EMT at 12:59 PM on 8/2/2023

## 2023-08-03 ENCOUNTER — TELEPHONE (OUTPATIENT)
Dept: INTERNAL MEDICINE | Facility: CLINIC | Age: 73
End: 2023-08-03
Payer: MEDICARE

## 2023-08-10 ENCOUNTER — MYC MEDICAL ADVICE (OUTPATIENT)
Dept: INTERNAL MEDICINE | Facility: CLINIC | Age: 73
End: 2023-08-10
Payer: MEDICARE

## 2023-08-24 ENCOUNTER — TRANSFERRED RECORDS (OUTPATIENT)
Dept: HEALTH INFORMATION MANAGEMENT | Facility: CLINIC | Age: 73
End: 2023-08-24
Payer: MEDICARE

## 2023-08-24 LAB — RETINOPATHY: NEGATIVE

## 2023-09-11 ENCOUNTER — ANCILLARY PROCEDURE (OUTPATIENT)
Dept: MAMMOGRAPHY | Facility: CLINIC | Age: 73
End: 2023-09-11
Attending: INTERNAL MEDICINE
Payer: COMMERCIAL

## 2023-09-11 DIAGNOSIS — Z12.31 VISIT FOR SCREENING MAMMOGRAM: ICD-10-CM

## 2023-09-11 PROCEDURE — 77063 BREAST TOMOSYNTHESIS BI: CPT | Performed by: RADIOLOGY

## 2023-09-11 PROCEDURE — 77067 SCR MAMMO BI INCL CAD: CPT | Performed by: RADIOLOGY

## 2023-09-18 ENCOUNTER — MYC MEDICAL ADVICE (OUTPATIENT)
Dept: INTERNAL MEDICINE | Facility: CLINIC | Age: 73
End: 2023-09-18
Payer: MEDICARE

## 2023-09-18 DIAGNOSIS — A60.00 GENITAL HERPES SIMPLEX, UNSPECIFIED SITE: ICD-10-CM

## 2023-09-21 RX ORDER — ACYCLOVIR 400 MG/1
400 TABLET ORAL EVERY 8 HOURS
Qty: 15 TABLET | Refills: 0 | Status: SHIPPED | OUTPATIENT
Start: 2023-09-21 | End: 2023-11-13

## 2023-10-22 ENCOUNTER — HEALTH MAINTENANCE LETTER (OUTPATIENT)
Age: 73
End: 2023-10-22

## 2023-10-27 ENCOUNTER — LAB (OUTPATIENT)
Dept: LAB | Facility: CLINIC | Age: 73
End: 2023-10-27
Payer: COMMERCIAL

## 2023-10-27 DIAGNOSIS — E78.1 HYPERTRIGLYCERIDEMIA: ICD-10-CM

## 2023-10-27 DIAGNOSIS — E11.9 TYPE 2 DIABETES MELLITUS WITHOUT COMPLICATION, WITHOUT LONG-TERM CURRENT USE OF INSULIN (H): Primary | ICD-10-CM

## 2023-10-27 DIAGNOSIS — E11.9 TYPE 2 DIABETES MELLITUS WITHOUT COMPLICATION, WITHOUT LONG-TERM CURRENT USE OF INSULIN (H): ICD-10-CM

## 2023-10-27 LAB
ALBUMIN SERPL BCG-MCNC: 4.4 G/DL (ref 3.5–5.2)
ALP SERPL-CCNC: 81 U/L (ref 35–104)
ALT SERPL W P-5'-P-CCNC: 30 U/L (ref 0–50)
AST SERPL W P-5'-P-CCNC: 34 U/L (ref 0–45)
BILIRUB DIRECT SERPL-MCNC: <0.2 MG/DL (ref 0–0.3)
BILIRUB SERPL-MCNC: 0.7 MG/DL
HBA1C MFR BLD: 7.4 %
PROT SERPL-MCNC: 7.1 G/DL (ref 6.4–8.3)
TSH SERPL DL<=0.005 MIU/L-ACNC: 2.24 UIU/ML (ref 0.3–4.2)

## 2023-10-27 PROCEDURE — 99000 SPECIMEN HANDLING OFFICE-LAB: CPT | Performed by: PATHOLOGY

## 2023-10-27 PROCEDURE — 80076 HEPATIC FUNCTION PANEL: CPT | Performed by: PATHOLOGY

## 2023-10-27 PROCEDURE — 36415 COLL VENOUS BLD VENIPUNCTURE: CPT | Performed by: PATHOLOGY

## 2023-10-27 PROCEDURE — 84443 ASSAY THYROID STIM HORMONE: CPT | Performed by: PATHOLOGY

## 2023-10-27 PROCEDURE — 83036 HEMOGLOBIN GLYCOSYLATED A1C: CPT | Performed by: INTERNAL MEDICINE

## 2023-11-10 ENCOUNTER — MYC REFILL (OUTPATIENT)
Dept: INTERNAL MEDICINE | Facility: CLINIC | Age: 73
End: 2023-11-10
Payer: MEDICARE

## 2023-11-10 DIAGNOSIS — A60.00 GENITAL HERPES SIMPLEX, UNSPECIFIED SITE: ICD-10-CM

## 2023-11-10 RX ORDER — ACYCLOVIR 400 MG/1
400 TABLET ORAL EVERY 8 HOURS
Qty: 15 TABLET | Refills: 0 | Status: CANCELLED | OUTPATIENT
Start: 2023-11-10

## 2023-11-11 DIAGNOSIS — A60.00 GENITAL HERPES SIMPLEX, UNSPECIFIED SITE: ICD-10-CM

## 2023-11-13 RX ORDER — ACYCLOVIR 400 MG/1
400 TABLET ORAL EVERY 8 HOURS
Qty: 15 TABLET | Refills: 0 | Status: SHIPPED | OUTPATIENT
Start: 2023-11-13 | End: 2024-01-30

## 2023-11-13 NOTE — TELEPHONE ENCOUNTER
Last Clinic Visit: 8/2/2023 [facility] [place]    Refilled antiviral medication as ordered by provider.

## 2023-11-20 ENCOUNTER — MYC MEDICAL ADVICE (OUTPATIENT)
Dept: INTERNAL MEDICINE | Facility: CLINIC | Age: 73
End: 2023-11-20
Payer: MEDICARE

## 2023-11-30 ENCOUNTER — MYC MEDICAL ADVICE (OUTPATIENT)
Dept: INTERNAL MEDICINE | Facility: CLINIC | Age: 73
End: 2023-11-30
Payer: MEDICARE

## 2024-01-30 ENCOUNTER — MYC REFILL (OUTPATIENT)
Dept: INTERNAL MEDICINE | Facility: CLINIC | Age: 74
End: 2024-01-30
Payer: MEDICARE

## 2024-01-30 ENCOUNTER — MYC MEDICAL ADVICE (OUTPATIENT)
Dept: INTERNAL MEDICINE | Facility: CLINIC | Age: 74
End: 2024-01-30
Payer: MEDICARE

## 2024-01-30 DIAGNOSIS — A60.00 GENITAL HERPES SIMPLEX, UNSPECIFIED SITE: ICD-10-CM

## 2024-01-30 RX ORDER — ACYCLOVIR 400 MG/1
400 TABLET ORAL EVERY 8 HOURS
Qty: 15 TABLET | Refills: 3 | Status: SHIPPED | OUTPATIENT
Start: 2024-01-30 | End: 2024-04-24

## 2024-01-30 RX ORDER — ACYCLOVIR 400 MG/1
400 TABLET ORAL EVERY 8 HOURS
Qty: 15 TABLET | Refills: 0 | Status: CANCELLED | OUTPATIENT
Start: 2024-01-30

## 2024-01-30 NOTE — TELEPHONE ENCOUNTER
acyclovir (ZOVIRAX) 400 MG tablet     Last Written Prescription Date:  11/13/23  Last Fill Quantity: 15,   # refills: 0  Last Office Visit : 8/2/23     Genital herpes simplex, unspecified site [A60.00]       Refilled antiviral medication as ordered by provider/per protocol.     Pt reports genital herpes flares 2-3x/year. Takes acyclovir as needed during these outbreaks.   - acyclovir (ZOVIRAX) 400 MG tablet; Take 1 tablet (400 mg) by mouth every 8 hours for 5 days

## 2024-01-31 ENCOUNTER — LAB (OUTPATIENT)
Dept: LAB | Facility: CLINIC | Age: 74
End: 2024-01-31
Payer: MEDICARE

## 2024-01-31 DIAGNOSIS — Z12.11 SCREENING FOR COLON CANCER: ICD-10-CM

## 2024-02-02 ENCOUNTER — OFFICE VISIT (OUTPATIENT)
Dept: AUDIOLOGY | Facility: CLINIC | Age: 74
End: 2024-02-02
Payer: MEDICARE

## 2024-02-02 DIAGNOSIS — H91.92 LEFT EAR HEARING LOSS: ICD-10-CM

## 2024-02-02 PROCEDURE — 92557 COMPREHENSIVE HEARING TEST: CPT | Performed by: AUDIOLOGIST

## 2024-02-02 PROCEDURE — 92550 TYMPANOMETRY & REFLEX THRESH: CPT | Performed by: AUDIOLOGIST

## 2024-02-02 NOTE — PROGRESS NOTES
AUDIOLOGY REPORT    SUBJECTIVE:  Zaira Hartmann is a 73 year old female who was seen in the Audiology Clinic at the [facility] for audiologic evaluation, referred by Kofi Hand M.D. Patient reports decreased hearing left ear for the last couple of years. When lays on her right ear, cannot understand speech well from left ear. Denies pain, drainage, dizziness, bothersome tinnitus, or a hx of er surgeries or noise exposure.    OBJECTIVE:Otoscopic exam indicates ears are clear of cerumen bilaterally     Pure Tone Thresholds assessed using conventional audiometry with good  reliability from 250-8000 Hz bilaterally using insert earphones and circumaural headphones     RIGHT:  normal through 4000 Hz, sloping to moderate sensorineural hearing loss    LEFT:    normal sloping to moderate sensorineural hearing loss    Tympanogram:    RIGHT: normal eardrum mobility    LEFT:   normal eardrum mobility    Reflexes (reported by stimulus ear):  RIGHT: Ipsilateral is present at normal levels  RIGHT: Contralateral is present at normal levels  LEFT:   Ipsilateral is present at normal levels  LEFT:   Contralateral is present at normal levels      Speech Reception Threshold:    RIGHT: 20 dB HL    LEFT:   20 dB HL  Word Recognition Score:     RIGHT: 96% at 60 dB HL using NU-6 recorded word list.    LEFT:   96% at 60 dB HL using NU-6 recorded word list.      ASSESSMENT:  Today s results were discussed with the patient in detail.     PLAN:  Patient was counseled regarding hearing loss and impact on communication. Return in one year for monitoring of hearing status, sooner if a sudden change occurs, or new symptoms arise. If asymmetry worsens, a referral to ENT services may be warranted.  Please call this clinic with questions regarding these results or recommendations.        Sree Morales., [facility]  Licensed Audiologist  [place] #[id number]

## 2024-02-12 ENCOUNTER — OFFICE VISIT (OUTPATIENT)
Dept: INTERNAL MEDICINE | Facility: CLINIC | Age: 74
End: 2024-02-12
Payer: MEDICARE

## 2024-02-12 VITALS
WEIGHT: 161.4 LBS | BODY MASS INDEX: 28.81 KG/M2 | SYSTOLIC BLOOD PRESSURE: 125 MMHG | DIASTOLIC BLOOD PRESSURE: 80 MMHG | OXYGEN SATURATION: 95 % | HEART RATE: 85 BPM

## 2024-02-12 DIAGNOSIS — M70.52 INFRAPATELLAR BURSITIS OF LEFT KNEE: Primary | ICD-10-CM

## 2024-02-12 DIAGNOSIS — E11.9 TYPE 2 DIABETES MELLITUS WITHOUT COMPLICATION, WITHOUT LONG-TERM CURRENT USE OF INSULIN (H): ICD-10-CM

## 2024-02-12 DIAGNOSIS — M70.50 PES ANSERINE BURSITIS: ICD-10-CM

## 2024-02-12 PROCEDURE — 99213 OFFICE O/P EST LOW 20 MIN: CPT | Performed by: INTERNAL MEDICINE

## 2024-02-12 NOTE — PROGRESS NOTES
Hayden is a 73 year old that presents in clinic today for the following:     Chief Complaint   Patient presents with    Knee Pain     Left knee pain. Pinching below the knee cap when straightening left leg for the past week.           2/12/2024    11:22 AM   Additional Questions   Roomed by KTR     Screenings as of today     PHQ-2 Total Score (Adult) - Positive if 3 or more points; Administer   PHQ-9 if positive 0        KEITH Whyte at 11:25 AM on 2/12/2024

## 2024-02-12 NOTE — PROGRESS NOTES
Assessment & Plan     Infrapatellar bursitis of left knee  and  Pes anserine bursitis    Hayden presents with 1 week of pain distal to the left patella without swelling or redness.  Pain could be related to infrapatellar and/or pes anserine bursitis or maybe some tendonitis.  No sign of infection.  No joint line pain to suggest a problem in the joint.  Discussed ongoing home care with ice, can take ibuprofen prn, and handout provided with some home exercises.  If not improving in a few weeks, can refer for PT.     Type 2 diabetes mellitus without complication, without long-term current use of insulin (H)    Hayden deferred having her A1C checked today- reports diet and not been as good related due to family health stressors and she'd like to work on improving this before checking the lab.       23 minutes spent by me on the date of the encounter doing chart review, history and exam, documentation and further activities per the note        Subjective   Hayden is a 73 year old, presenting for the following health issues:  Knee Pain (Left knee pain. Pinching below the knee cap when straightening left leg for the past week.)    HPI     Joint Pain  Onset: last week after walking up 5 flights of stairs on a building tour  Description:   Location: left knee, distal to knee cap when straightening leg  Character: Sharp and pinching  Progression of Symptoms: little bit better better  Accompanying Signs & Symptoms:  Other symptoms: has chronic numbness from prior surgery but nothing new; no warmth, swelling, and redness  History:   Previous similar pain: Left knee replacement in August 2019  Precipitating factors:   Trauma or overuse: YES- stairs as noted above  Alleviating factors:  Improved by: ice    Therapies Tried and outcome: Ice- helps.        Hayden is going to be on a sailboat for a few weeks in August and needs to be able to use the ladder      Constitutional, MSK systems are negative, except as otherwise noted.        Objective    /80 (BP Location: Right arm, Patient Position: Sitting, Cuff Size: Adult Regular)   Pulse 85   Wt 73.2 kg (161 lb 6.4 oz)   SpO2 95%   BMI 28.81 kg/m    Body mass index is 28.81 kg/m .  Physical Exam   GENERAL: healthy, alert and no distress  MS: left knee: pain to palpation distal to the patella both at midline and slightly medial without swelling and no redness noted, no joint laxity, no pain with varus or valgus stress test.  No pain with rotation of the lower leg             Signed Electronically by: Kofi Hand MD

## 2024-02-12 NOTE — PATIENT INSTRUCTIONS
Your pain is lower in the knee than the patella bursa that is in the diagram, but there are two other smaller bursae below the knee and the concept is the same.  Let us know if not improving after a couple of weeks, and I can place a physical therapy referral.

## 2024-02-22 PROCEDURE — 82274 ASSAY TEST FOR BLOOD FECAL: CPT | Performed by: INTERNAL MEDICINE

## 2024-02-22 PROCEDURE — 99000 SPECIMEN HANDLING OFFICE-LAB: CPT | Performed by: PATHOLOGY

## 2024-02-29 LAB — HEMOCCULT STL QL IA: NEGATIVE

## 2024-03-10 ENCOUNTER — HEALTH MAINTENANCE LETTER (OUTPATIENT)
Age: 74
End: 2024-03-10

## 2024-04-17 ENCOUNTER — LAB (OUTPATIENT)
Dept: LAB | Facility: CLINIC | Age: 74
End: 2024-04-17
Payer: MEDICARE

## 2024-04-17 DIAGNOSIS — E11.9 TYPE 2 DIABETES MELLITUS WITHOUT COMPLICATION, WITHOUT LONG-TERM CURRENT USE OF INSULIN (H): Primary | ICD-10-CM

## 2024-04-17 LAB — HBA1C MFR BLD: 7 %

## 2024-04-17 PROCEDURE — 83036 HEMOGLOBIN GLYCOSYLATED A1C: CPT | Performed by: INTERNAL MEDICINE

## 2024-04-17 PROCEDURE — 36415 COLL VENOUS BLD VENIPUNCTURE: CPT | Performed by: PATHOLOGY

## 2024-04-17 PROCEDURE — 99000 SPECIMEN HANDLING OFFICE-LAB: CPT | Performed by: PATHOLOGY

## 2024-04-24 ENCOUNTER — ANCILLARY PROCEDURE (OUTPATIENT)
Dept: CT IMAGING | Facility: CLINIC | Age: 74
End: 2024-04-24
Attending: ORAL & MAXILLOFACIAL SURGERY
Payer: MEDICARE

## 2024-04-24 ENCOUNTER — OFFICE VISIT (OUTPATIENT)
Dept: INTERNAL MEDICINE | Facility: CLINIC | Age: 74
End: 2024-04-24
Payer: MEDICARE

## 2024-04-24 VITALS
DIASTOLIC BLOOD PRESSURE: 88 MMHG | OXYGEN SATURATION: 94 % | BODY MASS INDEX: 28.9 KG/M2 | WEIGHT: 161.9 LBS | SYSTOLIC BLOOD PRESSURE: 134 MMHG | HEART RATE: 100 BPM

## 2024-04-24 DIAGNOSIS — C02.9 SQUAMOUS CELL CARCINOMA OF TONGUE (H): ICD-10-CM

## 2024-04-24 DIAGNOSIS — Z01.818 PREOP GENERAL PHYSICAL EXAM: Primary | ICD-10-CM

## 2024-04-24 DIAGNOSIS — E11.9 TYPE 2 DIABETES MELLITUS WITHOUT COMPLICATION, WITHOUT LONG-TERM CURRENT USE OF INSULIN (H): ICD-10-CM

## 2024-04-24 DIAGNOSIS — C02.9 SQUAMOUS CELL CANCER OF TONGUE (H): ICD-10-CM

## 2024-04-24 DIAGNOSIS — A60.00 GENITAL HERPES SIMPLEX, UNSPECIFIED SITE: ICD-10-CM

## 2024-04-24 PROCEDURE — 99213 OFFICE O/P EST LOW 20 MIN: CPT | Performed by: INTERNAL MEDICINE

## 2024-04-24 PROCEDURE — 71260 CT THORAX DX C+: CPT | Performed by: RADIOLOGY

## 2024-04-24 PROCEDURE — 70491 CT SOFT TISSUE NECK W/DYE: CPT | Mod: GC | Performed by: STUDENT IN AN ORGANIZED HEALTH CARE EDUCATION/TRAINING PROGRAM

## 2024-04-24 RX ORDER — IOPAMIDOL 755 MG/ML
79 INJECTION, SOLUTION INTRAVASCULAR ONCE
Status: COMPLETED | OUTPATIENT
Start: 2024-04-24 | End: 2024-04-24

## 2024-04-24 RX ORDER — ACYCLOVIR 400 MG/1
400 TABLET ORAL EVERY 8 HOURS
Qty: 15 TABLET | Refills: 3 | Status: SHIPPED | OUTPATIENT
Start: 2024-04-24

## 2024-04-24 RX ADMIN — IOPAMIDOL 79 ML: 755 INJECTION, SOLUTION INTRAVASCULAR at 16:01

## 2024-04-24 NOTE — PROGRESS NOTES
Preoperative Evaluation  [facility] [address] [address]  Phone: [phone]  Fax: [phone]  Primary Provider: Kofi Hand  Pre-op Performing Provider: KOFI HAND  Apr 24, 2024   {Provider  Link to PREOP SmartSet  Use this to apply standard patient instructions to AVS; includes medication directions, common orders, guidelines for anemia, warfarin, additional testing   :814108}    Hayden is a 73 year old, presenting for the following:  Pre-Op Exam (DOS 5/6/2024/Surgeon: Dr. Isaak Ya/Procedure: Left neck dissection, left tongue partial glossectomy/Location: [facility]/Fax #: [phone]/Recovering: Pt expects to be in hospital 1-2 days, then recovering at home for 6 weeks) and Results (Pt completed A1C labs recently, may need for preop)        4/24/2024     2:30 PM   Additional Questions   Roomed by ADRY     Surgical Information  Surgery/Procedure: ***  Surgery Location: ***  Surgeon: ***  Surgery Date: ***  Time of Surgery: ***  Where patient plans to recover: {Preop post recovery plans :966004}  Fax number for surgical facility: {:[phone]}    {2021 Provider Charting Preference for Preop :656023}    Subjective       HPI related to upcoming procedure: ***        4/23/2024     4:08 PM   Preop Questions   1. Have you ever had a heart attack or stroke? No   2. Have you ever had surgery on your heart or blood vessels, such as a stent placement, a coronary artery bypass, or surgery on an artery in your head, neck, heart, or legs? No   3. Do you have chest pain with activity? No   4. Do you have a history of  heart failure? No   5. Do you currently have a cold, bronchitis or symptoms of other infection? No   6. Do you have a cough, shortness of breath, or wheezing? No   7. Do you or anyone in your family have previous history of blood clots? No   8. Do you or does anyone in your family have a serious  bleeding problem such as prolonged bleeding following surgeries or cuts? No   9. Have you ever had problems with anemia or been told to take iron pills? No   10. Have you had any abnormal blood loss such as black, tarry or bloody stools, or abnormal vaginal bleeding? YES - ***   11. Have you ever had a blood transfusion? No   12. Are you willing to have a blood transfusion if it is medically needed before, during, or after your surgery? Yes   13. Have you or any of your relatives ever had problems with anesthesia? No   14. Do you have sleep apnea, excessive snoring or daytime drowsiness? No   15. Do you have any artifical heart valves or other implanted medical devices like a pacemaker, defibrillator, or continuous glucose monitor? No   16. Do you have artificial joints? YES - ***   17. Are you allergic to latex? No       Health Care Directive  Patient does not have a Health Care Directive or Living Will: {ADVANCE_DIRECTIVE_STATUS:323021}    Preoperative Review of   {Mnpmpreport:888601}  {Review MNPMP for all patients per [employer] MNPMP Profile:366036}    {Chronic problem details (Optional) :822853}    Patient Active Problem List    Diagnosis Date Noted    Type 2 diabetes mellitus without complication, without long-term current use of insulin (H) 07/27/2022     Priority: Medium    PTSD (post-traumatic stress disorder) 07/27/2022     Priority: Medium    Herpes simplex vulvovaginitis 07/27/2022     Priority: Medium    Gastroesophageal reflux disease without esophagitis 07/27/2022     Priority: Medium    Lichen planus 07/27/2022     Priority: Medium      Past Medical History:   Diagnosis Date    Arthritis A decade or more    Left knee replacement 2019    Cancer (H) 4/5/2024 Tongue    Surgery May 6, 2024    Gastroesophageal reflux disease without esophagitis 07/27/2022    Herpes simplex vulvovaginitis 07/27/2022    Lichen planus 07/27/2022    PTSD (post-traumatic stress disorder) 07/27/2022    Type 2 diabetes mellitus  without complication, without long-term current use of insulin (H) 07/27/2022     Past Surgical History:   Procedure Laterality Date    ABDOMEN SURGERY  1976    tubal ligation    ARTHROSCOPY KNEE Right     BIOPSY  2/23/24 and 4/5/24    Tongue    GENITOURINARY SURGERY  1976    Tubal ligation    HEAD & NECK SURGERY  May 6, 2024    tongue partial glossectomy and neck dissection    TONSILLECTOMY      TOTAL KNEE ARTHROPLASTY Left     TUBAL LIGATION       Current Outpatient Medications   Medication Sig Dispense Refill    acyclovir (ZOVIRAX) 400 MG tablet Take 1 tablet (400 mg) by mouth every 8 hours 15 tablet 3    atorvastatin (LIPITOR) 20 MG tablet Take 1 tablet (20 mg) by mouth daily 90 tablet 3    blood glucose (NO BRAND SPECIFIED) lancets standard Use to test blood sugar as directed.  OneTouch Delica Plus Lancet 30 gauge      blood glucose (ONETOUCH VERIO IQ) test strip Use to test blood sugar as directed.  OneTouch Verio test strips      blood glucose calibration (ONETOUCH VERIO) solution 1 drop by In Vitro route as needed Use to calibrate blood glucose monitor as needed as directed.      blood glucose monitoring (NO BRAND SPECIFIED) meter device kit Use to test blood sugar as directed.  OneTouch Verio Flex Start      FLUoxetine (PROZAC) 10 MG capsule Take 10 mg by mouth daily      LORazepam (ATIVAN) 0.5 MG tablet Take 1 tablet (0.5 mg) by mouth daily as needed for anxiety (prior to dental appointments) 3 tablet 1    metFORMIN (GLUCOPHAGE) 500 MG tablet Take 1 tablet (500 mg) by mouth 3 times daily (with meals) 270 tablet 3    scopolamine (TRANSDERM) 1 MG/3DAYS 72 hr patch Place 1 patch onto the skin every 72 hours 4 patch 0    acetaminophen (TYLENOL) 325 MG tablet Take 325 mg by mouth every 6 hours as needed for mild pain (Patient not taking: Reported on 4/24/2024)      hydrocortisone 2.5 % cream Apply topically 2 times daily (Patient not taking: Reported on 4/24/2024)         Allergies   Allergen Reactions     "Clotrimazole Hives        Social History     Tobacco Use    Smoking status: Never    Smokeless tobacco: Never    Tobacco comments:     Both parents smoked Camel unfiltered when I grew up   Substance Use Topics    Alcohol use: Yes     Alcohol/week: 1.0 standard drink of alcohol     Types: 1 Standard drinks or equivalent per week     Comment: Average 1 drink a week     {FAMILY HISTORY (Optional):749511952}  History   Drug Use Unknown         Review of Systems  {ROS Picklists (Optional):473121}    Objective    /88 (BP Location: Right arm, Patient Position: Sitting, Cuff Size: Adult Large)   Pulse 100   Wt 73.4 kg (161 lb 14.4 oz)   SpO2 94%   BMI 28.90 kg/m     Estimated body mass index is 28.9 kg/m  as calculated from the following:    Height as of 23: 1.594 m (5' 2.76\").    Weight as of this encounter: 73.4 kg (161 lb 14.4 oz).  Physical Exam  {Exam List (Optional):753787}    Recent Labs   Lab Test 24  1229 10/27/23  1210 07/10/23  1035   NA  --   --  139   POTASSIUM  --   --  4.5   CR  --   --  0.70   A1C 7.0* 7.4* 6.8*        Diagnostics  {LABS:454608}   {EK}    Revised Cardiac Risk Index (RCRI)  The patient has the following serious cardiovascular risks for perioperative complications:  {PREOP REVISED CARDIAC RISK INDEX (RCRI) :273334}     RCRI Interpretation: {REVISED CARDIAC RISK INTERPRETATION :205106}         Signed Electronically by: Kofi Hand MD  Copy of this evaluation report is provided to requesting physician.    {Provider Resources  Preop [facility] Preop Guidelines  Revised Cardiac Risk Index :156867}   {Email feedback regarding this note to [email or web address]   :970796}  "

## 2024-04-24 NOTE — PATIENT INSTRUCTIONS
Some insurances only cover some vaccines if you get them at the pharmacy and not in clinic.  You can either check your insurance coverage or just plan to get the following vaccines at the pharmacy: Shingrix (shingles vaccine) and Tetanus    You can get the following vaccines either in clinic or the pharmacy: COVID     Preparing for Your Surgery  Getting started  A nurse will call you to review your health history and instructions. They will give you an arrival time based on your scheduled surgery time. Please be ready to share:  Your doctor's clinic name and phone number  Your medical, surgical, and anesthesia history  A list of allergies and sensitivities  A list of medicines, including herbal treatments and over-the-counter drugs  Whether the patient has a legal guardian (ask how to send us the papers in advance)  Please tell us if you're pregnant--or if there's any chance you might be pregnant. Some surgeries may injure a fetus (unborn baby), so they require a pregnancy test. Surgeries that are safe for a fetus don't always need a test, and you can choose whether to have one.   If you have a child who's having surgery, please ask for a copy of Preparing for Your Child's Surgery.    Preparing for surgery  Within 10 to 30 days of surgery: Have a pre-op exam (sometimes called an H&P, or History and Physical). This can be done at a clinic or pre-operative center.  If you're having a , you may not need this exam. Talk to your care team.  At your pre-op exam, talk to your care team about all medicines you take. If you need to stop any medicines before surgery, ask when to start taking them again.  We do this for your safety. Many medicines can make you bleed too much during surgery. Some change how well surgery (anesthesia) drugs work.  Call your insurance company to let them know you're having surgery. (If you don't have insurance, call [phone].)  Call your clinic if there's any change in your health.  This includes signs of a cold or flu (sore throat, runny nose, cough, rash, fever). It also includes a scrape or scratch near the surgery site.  If you have questions on the day of surgery, call your hospital or surgery center.  Eating and drinking guidelines  For your safety: Unless your surgeon tells you otherwise, follow the guidelines below.  Eat and drink as usual until 8 hours before you arrive for surgery. After that, no food or milk.  Drink clear liquids until 2 hours before you arrive. These are liquids you can see through, like water, Gatorade, and Propel Water. They also include plain black coffee and tea (no cream or milk), candy, and breath mints. You can spit out gum when you arrive.  If you drink alcohol: Stop drinking it the night before surgery.  If your care team tells you to take medicine on the morning of surgery, it's okay to take it with a sip of water.  Preventing infection  Shower or bathe the night before and morning of your surgery. Follow the instructions your clinic gave you. (If no instructions, use regular soap.)  Don't shave or clip hair near your surgery site. We'll remove the hair if needed.  Don't smoke or vape the morning of surgery. You may chew nicotine gum up to 2 hours before surgery. A nicotine patch is okay.  Note: Some surgeries require you to completely quit smoking and nicotine. Check with your surgeon.  Your care team will make every effort to keep you safe from infection. We will:  Clean our hands often with soap and water (or an alcohol-based hand rub).  Clean the skin at your surgery site with a special soap that kills germs.  Give you a special gown to keep you warm. (Cold raises the risk of infection.)  Wear special hair covers, masks, gowns and gloves during surgery.  Give antibiotic medicine, if prescribed. Not all surgeries need antibiotics.  What to bring on the day of surgery  Photo ID and insurance card  Copy of your health care directive, if you have  one  Glasses and hearing aids (bring cases)  You can't wear contacts during surgery  Inhaler and eye drops, if you use them (tell us about these when you arrive)  CPAP machine or breathing device, if you use them  A few personal items, if spending the night  If you have . . .  A pacemaker, ICD (cardiac defibrillator) or other implant: Bring the ID card.  An implanted stimulator: Bring the remote control.  A legal guardian: Bring a copy of the certified (court-stamped) guardianship papers.  Please remove any jewelry, including body piercings. Leave jewelry and other valuables at home.  If you're going home the day of surgery  You must have a responsible adult drive you home. They should stay with you overnight as well.  If you don't have someone to stay with you, and you aren't safe to go home alone, we may keep you overnight. Insurance often won't pay for this.  After surgery  If it's hard to control your pain or you need more pain medicine, please call your surgeon's office.  Questions?   If you have any questions for your care team, list them here: _________________________________________________________________________________________________________________________________________________________________________ ____________________________________ ____________________________________ ____________________________________  For informational purposes only. Not to replace the advice of your health care provider. Copyright   2003, 2019 [employer] [facility]. All rights reserved. Clinically reviewed by Lili Sainz MD. [employer] [id number] - REV 12/22.    How to Take Your Medication Before Surgery  - HOLD (do not take) your METFORMIN on the morning of surgery.  - Do not use the lorazepam the morning of surgery if they are giving you other sedation

## 2024-04-24 NOTE — PROGRESS NOTES
Preoperative Evaluation  [facility] INTERNAL MEDICINE [address]  4TH FLOOR  [facility] [address]  Phone: [phone]  Fax: [phone]  Primary Provider: José Miguel Hand  Pre-op Performing Provider: JOSÉ MIGUEL HAND  Apr 24, 2024       Hayden is a 73 year old, presenting for the following:  Pre-Op Exam (DOS 5/6/2024/Surgeon: Dr. Isaak Ya/Procedure: Left neck dissection, left tongue partial glossectomy/Location: [facility]/Fax #: [phone]/Recovering: Pt expects to be in hospital 1-2 days, then recovering at home for 6 weeks) and Results (Pt completed A1C labs recently, may need for preop)      Surgical Information  Surgery/Procedure: Oral surgery Left neck dissection, left tongue partial glossectomy   Surgery Location: [facility]   Surgeon: Dr. Isaak Ya   Surgery Date: 5/6  Time of Surgery: 12:00 PM  Where patient plans to recover: Other: Pt expects to be in hospital 1-2 days, then recovering at home for 6 weeks  Fax number for surgical facility: [phone]     Assessment & Plan     The proposed surgical procedure is considered INTERMEDIATE risk.    Preop general physical exam  and  Squamous cell cancer of tongue (H)    Surgical excision planned with lymph node dissection.      Type 2 diabetes mellitus without complication, without long-term current use of insulin (H)    Well controlled with A1C of 7    Genital herpes simplex, unspecified site    Refill provided    - acyclovir (ZOVIRAX) 400 MG tablet; Take 1 tablet (400 mg) by mouth every 8 hours        Antiplatelet or Anticoagulation Medication Instructions   - Patient is on no antiplatelet or anticoagulation medications.    Additional Medication Instructions   - metformin: HOLD day of surgery.   - Benzodiazepines: HOLD the morning of surgery    Recommendation  APPROVAL GIVEN to proceed with proposed procedure, without further diagnostic evaluation.      Subjective        HPI related to upcoming procedure: Hayden is having oral surgery for squamous cell carcinoma of the tongue.      She has a history of diabetes and recent A1C was 7.  Home blood sugars haven't been checked recently.           4/23/2024     4:08 PM   Preop Questions   1. Have you ever had a heart attack or stroke? No   2. Have you ever had surgery on your heart or blood vessels, such as a stent placement, a coronary artery bypass, or surgery on an artery in your head, neck, heart, or legs? No   3. Do you have chest pain with activity? No   4. Do you have a history of  heart failure? No   5. Do you currently have a cold, bronchitis or symptoms of other infection? No   6. Do you have a cough, shortness of breath, or wheezing? No   7. Do you or anyone in your family have previous history of blood clots? No   8. Do you or does anyone in your family have a serious bleeding problem such as prolonged bleeding following surgeries or cuts? No   9. Have you ever had problems with anemia or been told to take iron pills? No   10. Have you had any abnormal blood loss such as black, tarry or bloody stools, or abnormal vaginal bleeding? YES - 20 years ago after a trip to [place], but no issues since then   11. Have you ever had a blood transfusion? No   12. Are you willing to have a blood transfusion if it is medically needed before, during, or after your surgery? Yes   13. Have you or any of your relatives ever had problems with anesthesia? No   14. Do you have sleep apnea, excessive snoring or daytime drowsiness? No   15. Do you have any artifical heart valves or other implanted medical devices like a pacemaker, defibrillator, or continuous glucose monitor? No   16. Do you have artificial joints? YES - left TKA    17. Are you allergic to latex? No       Health Care Directive  Patient does not have a Health Care Directive or Living Will: Patient states has Advance Directive and will bring in a copy to clinic.     Preoperative  Review of    reviewed - 10 tablets of Norco refilled on 6/2 and again on 4/5        Patient Active Problem List    Diagnosis Date Noted    Type 2 diabetes mellitus without complication, without long-term current use of insulin (H) 07/27/2022     Priority: Medium    PTSD (post-traumatic stress disorder) 07/27/2022     Priority: Medium    Herpes simplex vulvovaginitis 07/27/2022     Priority: Medium    Gastroesophageal reflux disease without esophagitis 07/27/2022     Priority: Medium    Lichen planus 07/27/2022     Priority: Medium      Past Medical History:   Diagnosis Date    Arthritis A decade or more    Left knee replacement 2019    Cancer (H) 4/5/2024 Tongue    Surgery May 6, 2024    Gastroesophageal reflux disease without esophagitis 07/27/2022    Herpes simplex vulvovaginitis 07/27/2022    Lichen planus 07/27/2022    PTSD (post-traumatic stress disorder) 07/27/2022    Type 2 diabetes mellitus without complication, without long-term current use of insulin (H) 07/27/2022     Past Surgical History:   Procedure Laterality Date    ABDOMEN SURGERY  1976    tubal ligation    ARTHROSCOPY KNEE Right     BIOPSY  2/23/24 and 4/5/24    Tongue    GENITOURINARY SURGERY  1976    Tubal ligation    HEAD & NECK SURGERY  May 6, 2024    tongue partial glossectomy and neck dissection    TONSILLECTOMY      TOTAL KNEE ARTHROPLASTY Left     TUBAL LIGATION       Current Outpatient Medications   Medication Sig Dispense Refill    acyclovir (ZOVIRAX) 400 MG tablet Take 1 tablet (400 mg) by mouth every 8 hours 15 tablet 3    atorvastatin (LIPITOR) 20 MG tablet Take 1 tablet (20 mg) by mouth daily 90 tablet 3    blood glucose (NO BRAND SPECIFIED) lancets standard Use to test blood sugar as directed.  OneTouch Delica Plus Lancet 30 gauge      blood glucose (ONETOUCH VERIO IQ) test strip Use to test blood sugar as directed.  OneTouch Verio test strips      blood glucose calibration (ONETOUCH VERIO) solution 1 drop by In Vitro route as  "needed Use to calibrate blood glucose monitor as needed as directed.      blood glucose monitoring (NO BRAND SPECIFIED) meter device kit Use to test blood sugar as directed.  OneTouch Verio Flex Start      FLUoxetine (PROZAC) 10 MG capsule Take 10 mg by mouth daily      LORazepam (ATIVAN) 0.5 MG tablet Take 1 tablet (0.5 mg) by mouth daily as needed for anxiety (prior to dental appointments) 3 tablet 1    metFORMIN (GLUCOPHAGE) 500 MG tablet Take 1 tablet (500 mg) by mouth 3 times daily (with meals) 270 tablet 3    scopolamine (TRANSDERM) 1 MG/3DAYS 72 hr patch Place 1 patch onto the skin every 72 hours 4 patch 0    acetaminophen (TYLENOL) 325 MG tablet Take 325 mg by mouth every 6 hours as needed for mild pain (Patient not taking: Reported on 4/24/2024)      hydrocortisone 2.5 % cream Apply topically 2 times daily (Patient not taking: Reported on 4/24/2024)         Allergies   Allergen Reactions    Clotrimazole Hives        Social History     Tobacco Use    Smoking status: Never    Smokeless tobacco: Never    Tobacco comments:     Both parents smoked Camel unfiltered when I grew up   Substance Use Topics    Alcohol use: Yes     Alcohol/week: 1.0 standard drink of alcohol     Types: 1 Standard drinks or equivalent per week     Comment: Average 1 drink a week       History   Drug Use Unknown         Review of Systems  10 point ROS of systems including Constitutional, Eyes, Respiratory, Cardiovascular, Gastroenterology, Genitourinary, Integumentary, Muscularskeletal, Psychiatric were all negative except for pertinent positives noted in my HPI.     Objective    /88 (BP Location: Right arm, Patient Position: Sitting, Cuff Size: Adult Large)   Pulse 100   Wt 73.4 kg (161 lb 14.4 oz)   SpO2 94%   BMI 28.90 kg/m     Estimated body mass index is 28.9 kg/m  as calculated from the following:    Height as of 8/2/23: 1.594 m (5' 2.76\").    Weight as of this encounter: 73.4 kg (161 lb 14.4 oz).  Physical Exam      " GENERAL APPEARANCE: healthy, alert and no distress     HENT: normal ear canals and TMs, tongue lesion noted     NECK: no adenopathy, no asymmetry, masses, or scars     RESP: lungs clear to auscultation - no rales, rhonchi or wheezes     CV: regular rates and rhythm, normal S1 S2, no S3 or S4 and no murmur, click or rub -     ABDOMEN:  soft, nontender, no HSM or masses and bowel sounds normal     MS: extremities normal- no gross deformities noted, no evidence of inflammation in joints       NEURO: mentation intact and speech normal     PSYCH: mentation appears normal. and affect normal/bright     LYMPHATICS: No cervical or supraclavicular nodes     Recent Labs   Lab Test 04/17/24  1229 10/27/23  1210 07/10/23  1035   NA  --   --  139   POTASSIUM  --   --  4.5   CR  --   --  0.70   A1C 7.0* 7.4* 6.8*        Diagnostics  No labs were ordered during this visit.   No EKG required, no history of coronary heart disease, significant arrhythmia, peripheral arterial disease or other structural heart disease.    Revised Cardiac Risk Index (RCRI)  The patient has the following serious cardiovascular risks for perioperative complications:   - No serious cardiac risks = 0 points     RCRI Interpretation: 0 points: Class I (very low risk - 0.4% complication rate)         Signed Electronically by: Kofi Hand MD  Copy of this evaluation report is provided to requesting physician.

## 2024-04-24 NOTE — DISCHARGE INSTRUCTIONS

## 2024-07-14 ENCOUNTER — MYC MEDICAL ADVICE (OUTPATIENT)
Dept: INTERNAL MEDICINE | Facility: CLINIC | Age: 74
End: 2024-07-14
Payer: MEDICARE

## 2024-07-14 DIAGNOSIS — Z98.890 S/P PARTIAL GLOSSECTOMY: Primary | ICD-10-CM

## 2024-07-16 NOTE — TELEPHONE ENCOUNTER
I signed order for PM&R for patient. I also placed a Speech referral for patient. Patient should make a follow up appointment with Dr. Hand in general.     Charles Kwong MD  Internal Medicine  [facility], [place], [place]

## 2024-07-18 ENCOUNTER — TELEPHONE (OUTPATIENT)
Dept: PHYSICAL MEDICINE AND REHAB | Facility: CLINIC | Age: 74
End: 2024-07-18
Payer: MEDICARE

## 2024-07-18 DIAGNOSIS — Z90.49 HISTORY OF GLOSSECTOMY: Primary | ICD-10-CM

## 2024-07-18 DIAGNOSIS — E11.9 TYPE 2 DIABETES MELLITUS WITHOUT COMPLICATION, WITHOUT LONG-TERM CURRENT USE OF INSULIN (H): ICD-10-CM

## 2024-07-18 NOTE — TELEPHONE ENCOUNTER
Pt referred by PCP Dr Hand (pt's request - wants to be seen at [facility])  Hayden is s/p glossectomy performed at [facility] in May    Appropriate for ONCOLOGY REHABILITATION F/U with Dr Leidy Fitzgerald  Usual order to refer to Dr Fitzgerald is in the Oncology department, however PCP would not be familiar with that -    Looks like SLP is needed as well, will ask Dr Fitzgerald to facilitate    Sending this request to Joan, nurse coordinator for this service.    Kamla George RN on 7/18/2024 at 4:34 PM    My desk phone is [phone]

## 2024-07-18 NOTE — TELEPHONE ENCOUNTER
Left Voicemail (1st Attempt) for the patient to call back and schedule the following:    Appointment type: [facility] RETURN  Provider: PCP  Return date: soonest available, or earliest convenience of pt  Specialty phone number: [phone]

## 2024-07-18 NOTE — TELEPHONE ENCOUNTER
[facility] Call Center    Phone Message    May a detailed message be left on voicemail: yes     Reason for Call: Other: Patient calling to schedule from referral.  Writer not able to with diagnosis of: S/P left neck dissecion, left tongue partial glossectomy not on protocols.  Please call her back to schedule.      Action Taken: Message routed to:  [facility] PMR    Travel Screening: Not Applicable     Date of Service:

## 2024-07-20 ENCOUNTER — MYC MEDICAL ADVICE (OUTPATIENT)
Dept: INTERNAL MEDICINE | Facility: CLINIC | Age: 74
End: 2024-07-20
Payer: MEDICARE

## 2024-07-20 RX ORDER — ATORVASTATIN CALCIUM 20 MG/1
20 TABLET, FILM COATED ORAL DAILY
Qty: 90 TABLET | Refills: 0 | Status: SHIPPED | OUTPATIENT
Start: 2024-07-20 | End: 2024-09-13

## 2024-07-20 NOTE — TELEPHONE ENCOUNTER
atorvastatin (LIPITOR) 20 MG tablet   Disp-90 tablet, R-3,   Start: 08/02/2023 4/24/2024  [facility] [place]    Kofi Hand MD  Internal Medicine    Routed because:   Antihyperlipidemic agents Angtep9207/18/2024 12:47 AM   Protocol Details LDL on file in the past 12 months

## 2024-07-22 NOTE — TELEPHONE ENCOUNTER
Pt is now booked to see Dr Leidy Fitzgerald on 8/16/24 at the [facility].    Thank you to Oncology PM & R!

## 2024-07-23 NOTE — TELEPHONE ENCOUNTER
Left Voicemail (1st Attempt) for the patient to call back and schedule the following:    Appointment type: RTN  Provider: PCP  Return date: Next available  Specialty phone number: [phone]  Additional appointment(s) needed: -  Additonal Notes: requesting pt call [phone] to scheduled follow up w PCP

## 2024-07-28 ENCOUNTER — HEALTH MAINTENANCE LETTER (OUTPATIENT)
Age: 74
End: 2024-07-28

## 2024-08-07 ENCOUNTER — MYC MEDICAL ADVICE (OUTPATIENT)
Dept: INTERNAL MEDICINE | Facility: CLINIC | Age: 74
End: 2024-08-07
Payer: MEDICARE

## 2024-08-23 ENCOUNTER — MYC MEDICAL ADVICE (OUTPATIENT)
Dept: INTERNAL MEDICINE | Facility: CLINIC | Age: 74
End: 2024-08-23
Payer: MEDICARE

## 2024-09-11 ENCOUNTER — MYC REFILL (OUTPATIENT)
Dept: INTERNAL MEDICINE | Facility: CLINIC | Age: 74
End: 2024-09-11
Payer: MEDICARE

## 2024-09-11 DIAGNOSIS — E11.9 TYPE 2 DIABETES MELLITUS WITHOUT COMPLICATION, WITHOUT LONG-TERM CURRENT USE OF INSULIN (H): ICD-10-CM

## 2024-09-13 ENCOUNTER — LAB (OUTPATIENT)
Dept: LAB | Facility: CLINIC | Age: 74
End: 2024-09-13
Payer: MEDICARE

## 2024-09-13 ENCOUNTER — OFFICE VISIT (OUTPATIENT)
Dept: INTERNAL MEDICINE | Facility: CLINIC | Age: 74
End: 2024-09-13
Payer: MEDICARE

## 2024-09-13 ENCOUNTER — TELEPHONE (OUTPATIENT)
Dept: INTERNAL MEDICINE | Facility: CLINIC | Age: 74
End: 2024-09-13

## 2024-09-13 VITALS
DIASTOLIC BLOOD PRESSURE: 80 MMHG | SYSTOLIC BLOOD PRESSURE: 115 MMHG | BODY MASS INDEX: 27.58 KG/M2 | HEART RATE: 96 BPM | OXYGEN SATURATION: 97 % | WEIGHT: 154.5 LBS

## 2024-09-13 DIAGNOSIS — E11.9 TYPE 2 DIABETES MELLITUS WITHOUT COMPLICATION, WITHOUT LONG-TERM CURRENT USE OF INSULIN (H): ICD-10-CM

## 2024-09-13 DIAGNOSIS — Z01.818 PREOP GENERAL PHYSICAL EXAM: Primary | ICD-10-CM

## 2024-09-13 DIAGNOSIS — R94.8 ABNORMAL POSITRON EMISSION TOMOGRAPHY (PET) SCAN: ICD-10-CM

## 2024-09-13 LAB
ANION GAP SERPL CALCULATED.3IONS-SCNC: 12 MMOL/L (ref 7–15)
BUN SERPL-MCNC: 14.4 MG/DL (ref 8–23)
CALCIUM SERPL-MCNC: 10 MG/DL (ref 8.8–10.4)
CHLORIDE SERPL-SCNC: 99 MMOL/L (ref 98–107)
CHOLEST SERPL-MCNC: 202 MG/DL
CREAT SERPL-MCNC: 0.68 MG/DL (ref 0.51–0.95)
CREAT UR-MCNC: 51.2 MG/DL
EGFRCR SERPLBLD CKD-EPI 2021: >90 ML/MIN/1.73M2
FASTING STATUS PATIENT QL REPORTED: NO
FASTING STATUS PATIENT QL REPORTED: NO
GLUCOSE SERPL-MCNC: 204 MG/DL (ref 70–99)
HBA1C MFR BLD: 8.8 %
HCO3 SERPL-SCNC: 23 MMOL/L (ref 22–29)
HDLC SERPL-MCNC: 54 MG/DL
LDLC SERPL CALC-MCNC: 112 MG/DL
MICROALBUMIN UR-MCNC: <12 MG/L
MICROALBUMIN/CREAT UR: NORMAL MG/G{CREAT}
NONHDLC SERPL-MCNC: 148 MG/DL
POTASSIUM SERPL-SCNC: 5.1 MMOL/L (ref 3.4–5.3)
SODIUM SERPL-SCNC: 134 MMOL/L (ref 135–145)
TRIGL SERPL-MCNC: 180 MG/DL

## 2024-09-13 PROCEDURE — 80061 LIPID PANEL: CPT | Performed by: PATHOLOGY

## 2024-09-13 PROCEDURE — 99207 PR FOOT EXAM NO CHARGE: CPT | Performed by: INTERNAL MEDICINE

## 2024-09-13 PROCEDURE — 99000 SPECIMEN HANDLING OFFICE-LAB: CPT | Performed by: PATHOLOGY

## 2024-09-13 PROCEDURE — 80048 BASIC METABOLIC PNL TOTAL CA: CPT | Performed by: PATHOLOGY

## 2024-09-13 PROCEDURE — 99214 OFFICE O/P EST MOD 30 MIN: CPT | Performed by: INTERNAL MEDICINE

## 2024-09-13 PROCEDURE — 83036 HEMOGLOBIN GLYCOSYLATED A1C: CPT | Performed by: INTERNAL MEDICINE

## 2024-09-13 PROCEDURE — 82043 UR ALBUMIN QUANTITATIVE: CPT | Performed by: INTERNAL MEDICINE

## 2024-09-13 PROCEDURE — 36415 COLL VENOUS BLD VENIPUNCTURE: CPT | Performed by: PATHOLOGY

## 2024-09-13 RX ORDER — CLINDAMYCIN HCL 300 MG
CAPSULE ORAL
COMMUNITY
Start: 2024-09-06

## 2024-09-13 RX ORDER — ATORVASTATIN CALCIUM 20 MG/1
20 TABLET, FILM COATED ORAL DAILY
Qty: 90 TABLET | Refills: 3 | Status: SHIPPED | OUTPATIENT
Start: 2024-09-13 | End: 2024-09-13

## 2024-09-13 RX ORDER — ATORVASTATIN CALCIUM 40 MG/1
40 TABLET, FILM COATED ORAL DAILY
Qty: 90 TABLET | Refills: 3 | Status: SHIPPED | OUTPATIENT
Start: 2024-09-13

## 2024-09-13 NOTE — PROGRESS NOTES
Preoperative Evaluation  [facility] INTERNAL MEDICINE [address]  4TH [facility] [address]  Phone: [phone]  Fax: [phone]  Primary Provider: Kofi Hand MD  Pre-op Performing Provider: Kofi Hand MD  Sep 13, 2024           9/8/2024   Surgical Information   What procedure is being done? Pre-Op for voice box biopsy.   Facility or Hospital where procedure/surgery will be performed: [facility]    Who is doing the procedure / surgery? Dom Ya MD    Date of surgery / procedure: September 20, 2024   Time of surgery / procedure: 4:20PM   Where do you plan to recover after surgery? at home with family        Fax number for surgical facility: [phone]     Assessment & Plan     The proposed surgical procedure is considered LOW risk.    Preop general physical exam  and  Abnormal positron emission tomography (PET) scan    Biopsy planned.    Type 2 diabetes mellitus without complication, without long-term current use of insulin (H)    A1C is 8.8 today- we will get in touch with her surgeon to see if they are okay proceeding at this number.  Increase metformin to 1000mg BID, work on dietary improvement, check blood sugar daily, and asked her to send a MyC message with home glucose numbers in a few weeks to review and see if we need to add another medication.  Recheck A1C in 3 months.  Normal foot exam today.  Had normal eye exam last year, recheck next year.      - FOOT EXAM  - BASIC METABOLIC PANEL; Future  - Lipid panel reflex to direct LDL Non-fasting; Future  - Albumin Random Urine Quantitative with Creat Ratio; Future  - HEMOGLOBIN A1C; Future  - metFORMIN (GLUCOPHAGE) 500 MG tablet; Take 1 tablet (500 mg) by mouth 3 times daily (with meals).  - atorvastatin (LIPITOR) 20 MG tablet; Take 1 tablet (20 mg) by mouth daily.          Preoperative Medication Instructions  Antiplatelet or Anticoagulation Medication Instructions   -  Patient is on no antiplatelet or anticoagulation medications.    Additional Medication Instructions   - metformin: DO NOT TAKE day of surgery.    Recommendation  Approval pending discussion of A1C with surgeon.     ADDENDUM 9/18:  Surgeon okay with proceeding with biopsy with her current A1C.  She is cleared to proceed with the procedure.       Nestor Blake is a 74 year old, presenting for the following:  Pre-Op Exam          9/13/2024     1:07 PM   Additional Questions   Roomed by Denice CODY related to upcoming procedure: Hayden had a PET scan on 8/15 that found a high signal spot in the vocal cords, so biopsy is planned.      Hayden is otherwise feeling some ongoing effects from her prior neck surgery with nerve damage and trouble moving her left shoulder.  She is going to be starting lymphedema therapy in October and speech therapy.      She is due for diabetic labs.  Has not checked home blood sugars regularly.            9/8/2024   Pre-Op Questionnaire   Have you ever had a heart attack or stroke? No   Have you ever had surgery on your heart or blood vessels, such as a stent placement, a coronary artery bypass, or surgery on an artery in your head, neck, heart, or legs? No   Do you have chest pain with activity? No   Do you have a history of heart failure? No   Do you currently have a cold, bronchitis or symptoms of other infection? No   Do you have a cough, shortness of breath, or wheezing? No   Do you or anyone in your family have previous history of blood clots? No   Do you or does anyone in your family have a serious bleeding problem such as prolonged bleeding following surgeries or cuts? No   Have you ever had problems with anemia or been told to take iron pills? No   Have you had any abnormal blood loss such as black, tarry or bloody stools, or abnormal vaginal bleeding? No   Have you ever had a blood transfusion? No   Are you willing to have a blood transfusion if it is medically needed before,  during, or after your surgery? Yes- 20 years ago after a trip to [place], but no issues since then   Have you or any of your relatives ever had problems with anesthesia? No   Do you have sleep apnea, excessive snoring or daytime drowsiness? No   Do you have any artifical heart valves or other implanted medical devices like a pacemaker, defibrillator, or continuous glucose monitor? No   Do you have artificial joints? (!) YES- left TKA   Are you allergic to latex? No        Health Care Directive  Patient does not have a Health Care Directive or Living Will: Patient states has Advance Directive and will bring in a copy to clinic.    Preoperative Review of    reviewed - Norco filled for 10 tablets on 4/5 and 9/6.  Oxycodone filled for 210 tablets on 5/9.        Patient Active Problem List    Diagnosis Date Noted    Type 2 diabetes mellitus without complication, without long-term current use of insulin (H) 07/27/2022     Priority: Medium    PTSD (post-traumatic stress disorder) 07/27/2022     Priority: Medium    Herpes simplex vulvovaginitis 07/27/2022     Priority: Medium    Gastroesophageal reflux disease without esophagitis 07/27/2022     Priority: Medium    Lichen planus 07/27/2022     Priority: Medium      Past Medical History:   Diagnosis Date    Arthritis A decade or more    Left knee replacement 2019    Cancer (H) 4/5/2024 Tongue    Surgery May 6, 2024    Gastroesophageal reflux disease without esophagitis 07/27/2022    Herpes simplex vulvovaginitis 07/27/2022    Lichen planus 07/27/2022    PTSD (post-traumatic stress disorder) 07/27/2022    Type 2 diabetes mellitus without complication, without long-term current use of insulin (H) 07/27/2022     Past Surgical History:   Procedure Laterality Date    ABDOMEN SURGERY  1976    tubal ligation    ARTHROSCOPY KNEE Right     BIOPSY  2/23/24 and 4/5/24    Tongue    GENITOURINARY SURGERY  1976    Tubal ligation    HEAD & NECK SURGERY  May 6, 2024    tongue partial  glossectomy and neck dissection    TONSILLECTOMY      TOTAL KNEE ARTHROPLASTY Left     TUBAL LIGATION       Current Outpatient Medications   Medication Sig Dispense Refill    acyclovir (ZOVIRAX) 400 MG tablet Take 1 tablet (400 mg) by mouth every 8 hours 15 tablet 3    atorvastatin (LIPITOR) 20 MG tablet TAKE 1 TABLET BY MOUTH EVERY DAY 90 tablet 0    blood glucose (NO BRAND SPECIFIED) lancets standard Use to test blood sugar as directed.  OneTouch Delica Plus Lancet 30 gauge      blood glucose (ONETOUCH VERIO IQ) test strip Use to test blood sugar as directed.  OneTouch Verio test strips      blood glucose calibration (ONETOUCH VERIO) solution 1 drop by In Vitro route as needed Use to calibrate blood glucose monitor as needed as directed.      blood glucose monitoring (NO BRAND SPECIFIED) meter device kit Use to test blood sugar as directed.  OneTouch Verio Flex Start      FLUoxetine (PROZAC) 10 MG capsule Take 10 mg by mouth daily      LORazepam (ATIVAN) 0.5 MG tablet Take 1 tablet (0.5 mg) by mouth daily as needed for anxiety (prior to dental appointments) 3 tablet 1    metFORMIN (GLUCOPHAGE) 500 MG tablet Take 1 tablet (500 mg) by mouth 3 times daily (with meals) 270 tablet 3    scopolamine (TRANSDERM) 1 MG/3DAYS 72 hr patch Place 1 patch onto the skin every 72 hours 4 patch 0    acetaminophen (TYLENOL) 325 MG tablet Take 325 mg by mouth every 6 hours as needed for mild pain (Patient not taking: Reported on 4/24/2024)      clindamycin (CLEOCIN) 300 MG capsule take 1 capsule by mouth four times a day for 7 days (Patient not taking: Reported on 9/13/2024)      hydrocortisone 2.5 % cream Apply topically 2 times daily (Patient not taking: Reported on 4/24/2024)         Allergies   Allergen Reactions    Clotrimazole Hives        Social History     Tobacco Use    Smoking status: Never    Smokeless tobacco: Never    Tobacco comments:     Both parents smoked Camel unfiltered when I grew up   Substance Use Topics     "Alcohol use: Yes     Alcohol/week: 1.0 standard drink of alcohol     Types: 1 Standard drinks or equivalent per week     Comment: Average 1 drink a week     History   Drug Use Unknown           10 point ROS of systems including Constitutional, Eyes, Respiratory, Cardiovascular, Gastroenterology, Genitourinary, Integumentary, Muscularskeletal, Psychiatric were all negative except for pertinent positives noted in my HPI.     Objective    /80 (BP Location: Left arm, Patient Position: Sitting, Cuff Size: Adult Large)   Pulse 96   Wt 70.1 kg (154 lb 8 oz)   SpO2 97%   BMI 27.58 kg/m     Estimated body mass index is 27.58 kg/m  as calculated from the following:    Height as of 8/2/23: 1.594 m (5' 2.76\").    Weight as of this encounter: 70.1 kg (154 lb 8 oz).  Physical Exam      GENERAL APPEARANCE: healthy, alert and no distress     HENT: normal ear canals and TMs, Recent tooth extraction lower left     NECK: central neck scar present     RESP: lungs clear to auscultation - no rales, rhonchi or wheezes     CV: regular rates and rhythm, normal S1 S2, no S3 or S4 and no murmur, click or rub -     ABDOMEN:  soft, nontender, no HSM or masses and bowel sounds normal     MS: extremities normal- no gross deformities noted, no evidence of inflammation in joints       NEURO: mentation intact and speech normal     PSYCH: mentation appears normal. and affect normal/bright     LYMPHATICS: No cervical or supraclavicular nodes   Diabetic foot: normal pulses, no sores, normal monofilament sensation      Recent Labs   Lab Test 04/24/24  1559 04/17/24  1229 10/27/23  1210   CR 0.6  --   --    A1C  --  7.0* 7.4*        Diagnostics  Recent Results (from the past 24 hour(s))   BASIC METABOLIC PANEL    Collection Time: 09/13/24  2:24 PM   Result Value Ref Range    Sodium 134 (L) 135 - 145 mmol/L    Potassium 5.1 3.4 - 5.3 mmol/L    Chloride 99 98 - 107 mmol/L    Carbon Dioxide (CO2) 23 22 - 29 mmol/L    Anion Gap 12 7 - 15 mmol/L    " Urea Nitrogen 14.4 8.0 - 23.0 mg/dL    Creatinine 0.68 0.51 - 0.95 mg/dL    GFR Estimate >90 >60 mL/min/1.73m2    Calcium 10.0 8.8 - 10.4 mg/dL    Glucose 204 (H) 70 - 99 mg/dL    Patient Fasting > 8hrs? No    Lipid panel reflex to direct LDL Non-fasting    Collection Time: 09/13/24  2:24 PM   Result Value Ref Range    Cholesterol 202 (H) <200 mg/dL    Triglycerides 180 (H) <150 mg/dL    Direct Measure HDL 54 >=50 mg/dL    LDL Cholesterol Calculated 112 (H) <100 mg/dL    Non HDL Cholesterol 148 (H) <130 mg/dL    Patient Fasting > 8hrs? No    HEMOGLOBIN A1C    Collection Time: 09/13/24  2:24 PM   Result Value Ref Range    Hemoglobin A1C 8.8 (H) <5.7 %      No EKG required, no history of coronary heart disease, significant arrhythmia, peripheral arterial disease or other structural heart disease.    Revised Cardiac Risk Index (RCRI)  The patient has the following serious cardiovascular risks for perioperative complications:   - No serious cardiac risks = 0 points     RCRI Interpretation: 0 points: Class I (very low risk - 0.4% complication rate)         Signed Electronically by: Kofi Hand MD  A copy of this evaluation report is provided to the requesting physician.

## 2024-09-13 NOTE — TELEPHONE ENCOUNTER
Hayden is scheduled for a laryngoscopy with biopsy on 9/20 with Dr. Dom Ya at [facility], but her A1C just returned at 8.8.  Will try to send a staff message to Dr. Ya to discuss if okay to proceed.     Kofi Hand MD

## 2024-09-13 NOTE — PATIENT INSTRUCTIONS

## 2024-09-16 NOTE — TELEPHONE ENCOUNTER
Could someone please to try contact Dr. Ya's office to see if they are okay with proceeding with the biopsy scheduled on 9/20 with an A1C of 8.8?    Thanks,  Kofi Hand MD

## 2024-09-16 NOTE — TELEPHONE ENCOUNTER
The RN called and spoke with the patient. The patient gave the [facility] RN verbal consent to communicate with Dr. Dom Ya's office. The patient also stated to the RN that Dr. Dom Ya's office's phone [phone]. The RN attempted to call to speak with a member of Dr. Ya's, but was unsuccessful and LVM for Dr. Ya's team to call the [facility] back.

## 2024-09-18 ENCOUNTER — MYC MEDICAL ADVICE (OUTPATIENT)
Dept: INTERNAL MEDICINE | Facility: CLINIC | Age: 74
End: 2024-09-18
Payer: MEDICARE

## 2024-09-18 NOTE — TELEPHONE ENCOUNTER
The RN attempted to call to speak with someone from Dr. Ya's team, but no one from Dr. Ya's team was available to speak with the [facility] RN at the time of the RN's phone call. RN left a message asking Dr. Ya's team call the [facility] back. Of note, GERA Joy does not work at Dr. Ya's office per Dr. Ya's office staff (see TE dated 9/18/2024 for call from GERA Joy).

## 2024-09-18 NOTE — TELEPHONE ENCOUNTER
"The RN called and spoke with BERNARDA Sheikh at [facility]/Dr. Dom Ya's team to relay Dr. Hand's message as outlined below. 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 - Oral ) on 9/13/2024. BERNARDA Sheikh stated that it was okay (if Dr. Hand was also okay with the plan) to proceed with the planned procedure on 9/20 in light of the patient's recent A1c result given that the procedure is to rule out cancer.     Message from Dr. Hand relayed to BERNARDA Sheikh w/ Dr. Ya's team by the [facility] [place] [facility] RN via telephone, \"Hayden is scheduled for a laryngoscopy with biopsy on 9/20 with Dr. Dom Ya at [facility], but her A1C just returned at 8.8...to see if they are okay with proceeding with the biopsy scheduled on 9/20 with an A1C of 8.8?\"   "

## 2024-09-18 NOTE — TELEPHONE ENCOUNTER
Great, thanks.  I figured that would probably be the answer, but wanted to make sure.  I have addended the pre-op note.    Kofi Hand MD

## 2024-10-06 ENCOUNTER — HEALTH MAINTENANCE LETTER (OUTPATIENT)
Age: 74
End: 2024-10-06

## 2024-11-20 ENCOUNTER — TRANSCRIBE ORDERS (OUTPATIENT)
Dept: OTHER | Age: 74
End: 2024-11-20

## 2024-11-20 DIAGNOSIS — R49.0 DYSPHONIA: Primary | ICD-10-CM

## 2024-12-15 ENCOUNTER — HEALTH MAINTENANCE LETTER (OUTPATIENT)
Age: 74
End: 2024-12-15

## 2024-12-16 ENCOUNTER — MYC MEDICAL ADVICE (OUTPATIENT)
Dept: INTERNAL MEDICINE | Facility: CLINIC | Age: 74
End: 2024-12-16
Payer: MEDICARE

## 2025-03-16 ENCOUNTER — HEALTH MAINTENANCE LETTER (OUTPATIENT)
Age: 75
End: 2025-03-16

## 2025-06-06 ENCOUNTER — MYC MEDICAL ADVICE (OUTPATIENT)
Dept: INTERNAL MEDICINE | Facility: CLINIC | Age: 75
End: 2025-06-06
Payer: MEDICARE

## 2025-06-06 ENCOUNTER — ORDERS ONLY (AUTO-RELEASED) (OUTPATIENT)
Dept: INTERNAL MEDICINE | Facility: CLINIC | Age: 75
End: 2025-06-06
Payer: MEDICARE

## 2025-06-06 DIAGNOSIS — Z12.11 SCREENING FOR COLON CANCER: Primary | ICD-10-CM

## 2025-06-06 DIAGNOSIS — Z12.11 SCREENING FOR COLON CANCER: ICD-10-CM

## 2025-06-27 ENCOUNTER — RESULTS FOLLOW-UP (OUTPATIENT)
Dept: INTERNAL MEDICINE | Facility: CLINIC | Age: 75
End: 2025-06-27

## 2025-06-27 DIAGNOSIS — R19.5 POSITIVE COLORECTAL CANCER SCREENING USING COLOGUARD TEST: Primary | ICD-10-CM

## 2025-06-27 PROCEDURE — 99000 SPECIMEN HANDLING OFFICE-LAB: CPT | Performed by: PATHOLOGY

## 2025-06-27 PROCEDURE — 83036 HEMOGLOBIN GLYCOSYLATED A1C: CPT | Performed by: INTERNAL MEDICINE

## 2025-07-01 ENCOUNTER — DOCUMENTATION ONLY (OUTPATIENT)
Dept: INTERNAL MEDICINE | Facility: CLINIC | Age: 75
End: 2025-07-01
Payer: MEDICARE

## 2025-07-01 NOTE — PROGRESS NOTES
Type of Form Received: [employer] Cologuard Order Verification    Form Received (Date) 7/1/25   Form Filled out Yes, faxed 7/2   Placed in provider folder Yes

## 2025-08-19 SDOH — HEALTH STABILITY: PHYSICAL HEALTH: ON AVERAGE, HOW MANY DAYS PER WEEK DO YOU ENGAGE IN MODERATE TO STRENUOUS EXERCISE (LIKE A BRISK WALK)?: 2 DAYS

## 2025-08-19 SDOH — HEALTH STABILITY: PHYSICAL HEALTH: ON AVERAGE, HOW MANY MINUTES DO YOU ENGAGE IN EXERCISE AT THIS LEVEL?: 30 MIN

## 2025-08-19 ASSESSMENT — SOCIAL DETERMINANTS OF HEALTH (SDOH): HOW OFTEN DO YOU GET TOGETHER WITH FRIENDS OR RELATIVES?: ONCE A WEEK

## 2025-08-20 ENCOUNTER — OFFICE VISIT (OUTPATIENT)
Dept: INTERNAL MEDICINE | Facility: CLINIC | Age: 75
End: 2025-08-20
Payer: MEDICARE

## 2025-08-20 VITALS
RESPIRATION RATE: 16 BRPM | BODY MASS INDEX: 27.96 KG/M2 | WEIGHT: 157.8 LBS | HEIGHT: 63 IN | HEART RATE: 89 BPM | DIASTOLIC BLOOD PRESSURE: 79 MMHG | OXYGEN SATURATION: 95 % | TEMPERATURE: 97.9 F | SYSTOLIC BLOOD PRESSURE: 126 MMHG

## 2025-08-20 DIAGNOSIS — C02.9 SQUAMOUS CELL CANCER OF TONGUE (H): ICD-10-CM

## 2025-08-20 DIAGNOSIS — Z00.00 ENCOUNTER FOR MEDICARE ANNUAL WELLNESS EXAM: Primary | ICD-10-CM

## 2025-08-20 DIAGNOSIS — E78.5 HYPERLIPIDEMIA DUE TO TYPE 2 DIABETES MELLITUS (H): ICD-10-CM

## 2025-08-20 DIAGNOSIS — Z23 NEED FOR VACCINATION: ICD-10-CM

## 2025-08-20 DIAGNOSIS — E11.9 TYPE 2 DIABETES MELLITUS WITHOUT COMPLICATION, WITHOUT LONG-TERM CURRENT USE OF INSULIN (H): ICD-10-CM

## 2025-08-20 DIAGNOSIS — E11.69 HYPERLIPIDEMIA DUE TO TYPE 2 DIABETES MELLITUS (H): ICD-10-CM

## 2025-08-20 DIAGNOSIS — Z12.31 VISIT FOR SCREENING MAMMOGRAM: ICD-10-CM

## 2025-08-20 RX ORDER — ATORVASTATIN CALCIUM 20 MG/1
20 TABLET, FILM COATED ORAL DAILY
Qty: 90 TABLET | Refills: 3 | Status: SHIPPED | OUTPATIENT
Start: 2025-08-20